# Patient Record
Sex: FEMALE | Race: WHITE | Employment: PART TIME | URBAN - METROPOLITAN AREA
[De-identification: names, ages, dates, MRNs, and addresses within clinical notes are randomized per-mention and may not be internally consistent; named-entity substitution may affect disease eponyms.]

---

## 2017-02-01 RX ORDER — RIZATRIPTAN BENZOATE 10 MG/1
10 TABLET ORAL AS NEEDED
COMMUNITY
Start: 2015-01-12 | End: 2018-03-20

## 2017-02-01 RX ORDER — MULTIVITAMIN
1 CAPSULE ORAL DAILY
COMMUNITY
Start: 2009-03-11

## 2017-02-01 RX ORDER — LEVOTHYROXINE SODIUM 0.15 MG/1
175 TABLET ORAL
COMMUNITY
Start: 2012-03-29 | End: 2017-09-12 | Stop reason: DRUGHIGH

## 2017-02-01 RX ORDER — TRIAMTERENE AND HYDROCHLOROTHIAZIDE 37.5; 25 MG/1; MG/1
CAPSULE ORAL
COMMUNITY
Start: 2011-08-23 | End: 2017-02-07

## 2017-02-01 RX ORDER — POTASSIUM CHLORIDE 20 MEQ/1
TABLET, EXTENDED RELEASE ORAL
Qty: 90 TABLET | Refills: 0 | Status: SHIPPED | OUTPATIENT
Start: 2017-02-01 | End: 2017-09-12

## 2017-02-01 RX ORDER — DESVENLAFAXINE 50 MG/1
TABLET, EXTENDED RELEASE ORAL
COMMUNITY
Start: 2009-03-11 | End: 2017-02-07

## 2017-02-01 RX ORDER — BETAMETHASONE VALERATE 0.1 %
LOTION (ML) TOPICAL
COMMUNITY
Start: 2007-07-17 | End: 2017-02-07

## 2017-02-01 RX ORDER — METOPROLOL SUCCINATE 25 MG/1
TABLET, EXTENDED RELEASE ORAL
COMMUNITY
Start: 2013-12-10 | End: 2017-02-07

## 2017-02-01 RX ORDER — RAMELTEON 8 MG/1
TABLET ORAL
COMMUNITY
Start: 2016-01-13 | End: 2017-03-03

## 2017-02-01 RX ORDER — ATORVASTATIN CALCIUM 20 MG/1
TABLET, FILM COATED ORAL
COMMUNITY
Start: 2007-06-10 | End: 2017-02-07

## 2017-02-01 RX ORDER — POTASSIUM CHLORIDE 20 MEQ/1
TABLET, EXTENDED RELEASE ORAL
COMMUNITY
Start: 2013-12-12 | End: 2017-02-07

## 2017-02-01 RX ORDER — CALCITRIOL 0.25 UG/1
0.25 CAPSULE, LIQUID FILLED ORAL DAILY
COMMUNITY
Start: 2013-12-10

## 2017-02-01 RX ORDER — CHLORAL HYDRATE 500 MG
1000 CAPSULE ORAL DAILY
COMMUNITY
Start: 2009-03-11

## 2017-02-07 ENCOUNTER — OFFICE VISIT (OUTPATIENT)
Dept: FAMILY MEDICINE CLINIC | Facility: CLINIC | Age: 57
End: 2017-02-07

## 2017-02-07 VITALS
SYSTOLIC BLOOD PRESSURE: 144 MMHG | WEIGHT: 217.63 LBS | HEIGHT: 66 IN | DIASTOLIC BLOOD PRESSURE: 82 MMHG | HEART RATE: 82 BPM | BODY MASS INDEX: 34.98 KG/M2 | TEMPERATURE: 99 F

## 2017-02-07 DIAGNOSIS — C73 MALIGNANT NEOPLASM OF THYROID GLAND (HCC): ICD-10-CM

## 2017-02-07 DIAGNOSIS — G47.30 SLEEP APNEA, UNSPECIFIED TYPE: ICD-10-CM

## 2017-02-07 DIAGNOSIS — E55.9 VITAMIN D DEFICIENCY: ICD-10-CM

## 2017-02-07 DIAGNOSIS — I10 BENIGN ESSENTIAL HYPERTENSION: ICD-10-CM

## 2017-02-07 DIAGNOSIS — E78.00 PURE HYPERCHOLESTEROLEMIA: ICD-10-CM

## 2017-02-07 DIAGNOSIS — Z00.00 HEALTH CARE MAINTENANCE: Primary | ICD-10-CM

## 2017-02-07 PROCEDURE — 99213 OFFICE O/P EST LOW 20 MIN: CPT | Performed by: FAMILY MEDICINE

## 2017-02-07 PROCEDURE — 99396 PREV VISIT EST AGE 40-64: CPT | Performed by: FAMILY MEDICINE

## 2017-02-07 RX ORDER — DESVENLAFAXINE 50 MG/1
50 TABLET, EXTENDED RELEASE ORAL DAILY
Qty: 90 TABLET | Refills: 1 | Status: SHIPPED | OUTPATIENT
Start: 2017-02-07 | End: 2017-11-10

## 2017-02-07 RX ORDER — METOPROLOL SUCCINATE 25 MG/1
25 TABLET, EXTENDED RELEASE ORAL DAILY
Qty: 90 TABLET | Refills: 1 | Status: SHIPPED | OUTPATIENT
Start: 2017-02-07 | End: 2017-11-10

## 2017-02-07 RX ORDER — BETAMETHASONE VALERATE 0.1 %
LOTION (ML) TOPICAL
Qty: 60 ML | Refills: 2 | Status: SHIPPED | OUTPATIENT
Start: 2017-02-07 | End: 2017-04-20

## 2017-02-07 RX ORDER — TRIAMTERENE AND HYDROCHLOROTHIAZIDE 37.5; 25 MG/1; MG/1
1 CAPSULE ORAL DAILY
Qty: 90 CAPSULE | Refills: 1 | Status: SHIPPED | OUTPATIENT
Start: 2017-02-07 | End: 2017-08-17

## 2017-02-07 RX ORDER — POTASSIUM CHLORIDE 20 MEQ/1
20 TABLET, EXTENDED RELEASE ORAL DAILY
Qty: 90 TABLET | Refills: 1 | Status: SHIPPED | OUTPATIENT
Start: 2017-02-07 | End: 2017-10-22

## 2017-02-07 RX ORDER — ATORVASTATIN CALCIUM 20 MG/1
20 TABLET, FILM COATED ORAL NIGHTLY
Qty: 90 TABLET | Refills: 1 | Status: SHIPPED | OUTPATIENT
Start: 2017-02-07 | End: 2017-10-03

## 2017-02-07 NOTE — PROGRESS NOTES
CC: Annual Physical Exam    HPI:   Florida Moreno is a 60year old female who presents for a complete physical exam.  Patient has no new complaints. Patient's past medical history and medications reviewed with her in detail.   Recent labs from endocrine revi Diagnosis Date   • Cancer [facility])      thyroid cancer   • Essential hypertension    • Hypothyroidism    • Sleep apnea           Past Surgical History    OTHER SURGICAL HISTORY      Comment fibroids     CHOLECYSTECTOMY      COLONOSCOPY      D & C        No bladder control. HEMATOLOGIC:  Denies anemia, bleeding or bruising. LYMPHATICS:  Denies enlarged nodes  PSYCHIATRIC:  Denies depression or anxiety. ENDOCRINOLOGIC:  Denies excessive sweating, cold or heat intolerance, polyuria or polydipsia.   ALLERGIES: for mammogram and dexascan  Self breast exam explained. Health maintenance, will check: No orders of the defined types were placed in this encounter.        Patient Instructions   rec fasting labs- chem and lipids, ua    Continue meds  D/w pt skin care  M Take 1 tablet (20 mEq total) by mouth daily. Triamterene-HCTZ (DYAZIDE) 37.5-25 MG Oral Cap 90 capsule 1      Sig: Take 1 capsule by mouth daily.            Imaging & Consults:  None    The patient is asked to return in 6 month    Mammogram yearly  Col

## 2017-02-07 NOTE — PATIENT INSTRUCTIONS
rec fasting labs- chem and lipids, ua    Continue meds  D/w pt skin care  Monitor diet and activity-goal of weight loss  Continue f.u sleep apnea reviewed

## 2017-02-08 ENCOUNTER — LAB ENCOUNTER (OUTPATIENT)
Dept: LAB | Age: 57
End: 2017-02-08
Attending: FAMILY MEDICINE
Payer: COMMERCIAL

## 2017-02-08 DIAGNOSIS — I10 BENIGN ESSENTIAL HYPERTENSION: ICD-10-CM

## 2017-02-08 DIAGNOSIS — E78.00 PURE HYPERCHOLESTEROLEMIA: ICD-10-CM

## 2017-02-08 LAB
ALBUMIN SERPL-MCNC: 3.7 G/DL (ref 3.5–4.8)
ALP LIVER SERPL-CCNC: 104 U/L (ref 46–118)
ALT SERPL-CCNC: 28 U/L (ref 14–54)
AST SERPL-CCNC: 14 U/L (ref 15–41)
BILIRUB SERPL-MCNC: 0.3 MG/DL (ref 0.1–2)
BILIRUB UR QL STRIP.AUTO: NEGATIVE
BUN BLD-MCNC: 10 MG/DL (ref 8–20)
CALCIUM BLD-MCNC: 7.8 MG/DL (ref 8.3–10.3)
CHLORIDE: 101 MMOL/L (ref 101–111)
CHOLEST SMN-MCNC: 169 MG/DL (ref ?–200)
CLARITY UR REFRACT.AUTO: CLEAR
CO2: 32 MMOL/L (ref 22–32)
COLOR UR AUTO: YELLOW
CREAT BLD-MCNC: 0.89 MG/DL (ref 0.55–1.02)
GLUCOSE BLD-MCNC: 94 MG/DL (ref 70–99)
GLUCOSE UR STRIP.AUTO-MCNC: NEGATIVE MG/DL
HDLC SERPL-MCNC: 70 MG/DL (ref 45–?)
HDLC SERPL: 2.41 {RATIO} (ref ?–4.44)
KETONES UR STRIP.AUTO-MCNC: NEGATIVE MG/DL
LDLC SERPL CALC-MCNC: 72 MG/DL (ref ?–130)
M PROTEIN MFR SERPL ELPH: 7.4 G/DL (ref 6.1–8.3)
NITRITE UR QL STRIP.AUTO: NEGATIVE
NONHDLC SERPL-MCNC: 99 MG/DL (ref ?–130)
PH UR STRIP.AUTO: 8 [PH] (ref 4.5–8)
POTASSIUM SERPL-SCNC: 3.8 MMOL/L (ref 3.6–5.1)
PROT UR STRIP.AUTO-MCNC: NEGATIVE MG/DL
RBC UR QL AUTO: NEGATIVE
SODIUM SERPL-SCNC: 139 MMOL/L (ref 136–144)
SP GR UR STRIP.AUTO: 1.01 (ref 1–1.03)
TRIGLYCERIDES: 136 MG/DL (ref ?–150)
UROBILINOGEN UR STRIP.AUTO-MCNC: <2 MG/DL
VLDL: 27 MG/DL (ref 5–40)

## 2017-02-08 PROCEDURE — 80061 LIPID PANEL: CPT

## 2017-02-08 PROCEDURE — 81001 URINALYSIS AUTO W/SCOPE: CPT

## 2017-02-08 PROCEDURE — 80053 COMPREHEN METABOLIC PANEL: CPT

## 2017-02-08 PROCEDURE — 87086 URINE CULTURE/COLONY COUNT: CPT

## 2017-02-10 ENCOUNTER — TELEPHONE (OUTPATIENT)
Dept: FAMILY MEDICINE CLINIC | Facility: CLINIC | Age: 57
End: 2017-02-10

## 2017-02-10 NOTE — TELEPHONE ENCOUNTER
----- Message from Radha Bourgeois MD sent at 2/10/2017  8:27 AM CST -----  Labs reviewed  Urine abnormal- high bacterial count but mixed brandi- if pt with s/s of uti I would treat; otherwise I would consider this contaminant with ua showing high level

## 2017-02-10 NOTE — TELEPHONE ENCOUNTER
Patient informed of the below results and recommendations. Patient states she is not having any urinary symptoms at this time but will c/b it symptoms do occur.  Fifi Kerr, 02/10/2017, 8:50 AM

## 2017-03-06 RX ORDER — RAMELTEON 8 MG
TABLET ORAL
Qty: 90 TABLET | Refills: 0 | Status: SHIPPED | OUTPATIENT
Start: 2017-03-06 | End: 2017-04-20

## 2017-03-06 NOTE — TELEPHONE ENCOUNTER
Pt is due for follow up appointment. Please have pt  Schedule apptointment . Refills until appointmentt ok.     Maria De Jesus Juarez MD

## 2017-04-20 ENCOUNTER — OFFICE VISIT (OUTPATIENT)
Dept: FAMILY MEDICINE CLINIC | Facility: CLINIC | Age: 57
End: 2017-04-20

## 2017-04-20 VITALS
WEIGHT: 273.63 LBS | HEART RATE: 89 BPM | DIASTOLIC BLOOD PRESSURE: 88 MMHG | TEMPERATURE: 99 F | SYSTOLIC BLOOD PRESSURE: 142 MMHG | BODY MASS INDEX: 44 KG/M2

## 2017-04-20 DIAGNOSIS — G47.33 OBSTRUCTIVE SLEEP APNEA SYNDROME: Primary | ICD-10-CM

## 2017-04-20 PROCEDURE — 94660 CPAP INITIATION&MGMT: CPT | Performed by: NURSE PRACTITIONER

## 2017-04-20 RX ORDER — RAMELTEON 8 MG/1
8 TABLET ORAL NIGHTLY
Qty: 90 TABLET | Refills: 1 | Status: SHIPPED | OUTPATIENT
Start: 2017-04-20 | End: 2017-05-15

## 2017-04-20 NOTE — PATIENT INSTRUCTIONS
Continue with CPAP therapy. Refilled Rozerem sent to pharmacy. Recheck in 6 months, sooner if any problems. Warned if still with sleep apnea and not using CPAP has 7 fold increased risk and heart attack, stroke, abnormal heart rhythm, and death.   Incr

## 2017-04-20 NOTE — PROGRESS NOTES
[facility]        HPI:   This is a 59year old female coming in for Patient presents with:  Obstructive Sleep Apnea (IRENE)    HPI:   Patient is present to review her compliance with sleep apnea treatment.  Had a few da Nocturia, Pulmonary HTN (PaO2 >60), LV dysfunciton, Snoring, Personality Change        Sleep Study Data   Sleep Study Date: 09/21/2012 (06/23/2015 10:04:18 AM)  Sleep Study Type Cpap Titration (06/23/2015 10:04:18 AM)  Sleep Lab: [place] (06/23/2015 10:04:18 A Comment:Other reaction(s): hives  Procaine                    Comment:Other reaction(s): shakey, heart racing, nervous  Current Meds:    Current Outpatient Prescriptions:  ramelteon (ROZEREM) 8 MG Oral Tab Take 1 tablet (8 mg total) by mouth nightly.  at be Insomnia     LAP-BAND surgery status     Enlarged lymph nodes     Blood in stool     Benign neoplasm of skin     Obesity     Palpitations     Periodic limb movement disorder     Menopausal and postmenopausal disorder     Pre-operative examination     Other adenopathy. Neurological: She is alert and oriented to person, place, and time. Skin: Skin is warm and dry. Psychiatric: She has a normal mood and affect.  Her behavior is normal. Judgment and thought content normal.   Nursing note and vitals reviewed

## 2017-05-15 ENCOUNTER — TELEPHONE (OUTPATIENT)
Dept: FAMILY MEDICINE CLINIC | Facility: CLINIC | Age: 57
End: 2017-05-15

## 2017-05-15 RX ORDER — ESZOPICLONE 2 MG/1
2 TABLET, FILM COATED ORAL NIGHTLY PRN
Qty: 90 TABLET | Refills: 0 | Status: SHIPPED
Start: 2017-05-15 | End: 2017-08-01

## 2017-05-15 NOTE — TELEPHONE ENCOUNTER
Fax received from [facility] listing alternatives to Rozerem that would cost less for patient. List includes, starting with the most expensive ending in the list expensive:    1. Eszopiclone 1mg tabs  2. Eszopiclone 2mg tabs  3.   Eszopiclone 3mg ta

## 2017-05-15 NOTE — TELEPHONE ENCOUNTER
Patient has been on Ambien in the past and did not help her sleep well. Can try Lunesta 2 mg nightly as needed for sleep.

## 2017-05-15 NOTE — TELEPHONE ENCOUNTER
Last Labs:  2/8/2017  Last OV:  2/7/2017  Future Appointments  Date Time Provider Lasha Still   10/19/2017 11:15 AM Neftaly Soares, DANYA [facility] [place] [facility] [place]

## 2017-05-15 NOTE — TELEPHONE ENCOUNTER
LM informing patient of the below. Prescription faxed to [facility].  Gerhard Will, 05/15/2017, 10:40 AM

## 2017-06-22 RX ORDER — DESVENLAFAXINE 50 MG/1
TABLET, EXTENDED RELEASE ORAL
Qty: 90 TABLET | OUTPATIENT
Start: 2017-06-22

## 2017-06-22 NOTE — TELEPHONE ENCOUNTER
Patient was given a 6 month refill by Dr. Saurabh Rasheed in February. Should have enough medication till her 6 month recheck with Dr. Saurabh Rasheed.

## 2017-06-22 NOTE — TELEPHONE ENCOUNTER
Future Appointments  Date Time Provider Lasha Still   10/19/2017 11:15 AM Dory Soares APN [facility] [place] [facility], 06/22/2017, 7:33 AM

## 2017-06-22 NOTE — TELEPHONE ENCOUNTER
Patient was given a 6 month refill by Dr. Vanna Pleitez in February and told to follow up in 6 months with Dr. Vanna Pleitez. Please let patient know. Thank you.   Cony Soares, 06/22/2017, 12:13 PM

## 2017-08-02 ENCOUNTER — PATIENT MESSAGE (OUTPATIENT)
Dept: FAMILY MEDICINE CLINIC | Facility: CLINIC | Age: 57
End: 2017-08-02

## 2017-08-02 RX ORDER — ESZOPICLONE 2 MG/1
2 TABLET, FILM COATED ORAL NIGHTLY PRN
Qty: 90 TABLET | Refills: 0 | Status: SHIPPED
Start: 2017-08-02 | End: 2017-08-02

## 2017-08-02 RX ORDER — ESZOPICLONE 3 MG/1
3 TABLET, FILM COATED ORAL NIGHTLY PRN
Qty: 90 TABLET | Refills: 0 | Status: SHIPPED
Start: 2017-08-02 | End: 2017-10-19

## 2017-08-02 NOTE — TELEPHONE ENCOUNTER
Future Appointments  Date Time Provider Lasha Tessy   10/19/2017 11:15 AM Bernie Soares APN [facility] [place] [facility] [place]     Recheck in 6 months, sooner if any problems.

## 2017-08-18 RX ORDER — TRIAMTERENE AND HYDROCHLOROTHIAZIDE 37.5; 25 MG/1; MG/1
CAPSULE ORAL
Qty: 90 CAPSULE | Refills: 0 | Status: SHIPPED | OUTPATIENT
Start: 2017-08-18 | End: 2017-11-15

## 2017-08-18 NOTE — TELEPHONE ENCOUNTER
Pt last seen for annual exam on 2/7/17- at that time plan included pt f/u in 6 months. Pt states she was not aware of that, pt states she will call back to get scheduled.     Future Appointments  Date Time Provider Lasha Still   10/19/2017 11:15 AM S

## 2017-09-08 RX ORDER — BETAMETHASONE VALERATE 0.1 %
LOTION (ML) TOPICAL
COMMUNITY
End: 2017-09-08

## 2017-09-08 RX ORDER — BETAMETHASONE VALERATE 0.1 %
LOTION (ML) TOPICAL
Qty: 180 ML | Refills: 0 | Status: SHIPPED | OUTPATIENT
Start: 2017-09-08 | End: 2017-10-17

## 2017-09-08 NOTE — TELEPHONE ENCOUNTER
----- Message from Shikha Sorto sent at 9/7/2017  9:20 PM CDT -----  Regarding: Prescription Question  Contact: [phone]  I need a refill on   Betamethasone Melina Lotion 60ml - However, I need 3 bottles ordered for one shipment. One for each month.  I kris

## 2017-09-08 NOTE — TELEPHONE ENCOUNTER
Future appt:     Your appointments     Date & Time Appointment Department [facility])    Sep 12, 2017  8:30 AM CDT Follow up - Extended with Joel Horton MD 25 [facility] [place] ([facility])    Oct 19, 2017 11:

## 2017-09-08 NOTE — TELEPHONE ENCOUNTER
Last appt:  2/7/17  Last lab: 2/8/17    Future Appointments  Date Time Provider Lasha Still   9/12/2017 8:30 AM Jade Galarza MD [facility] [place] [facility] [place]   10/19/2017 11:15 AM Supriya Soares APN [facility] [place] [facility] [place]     Checked [facility]

## 2017-09-12 ENCOUNTER — APPOINTMENT (OUTPATIENT)
Dept: LAB | Age: 57
End: 2017-09-12
Attending: FAMILY MEDICINE
Payer: COMMERCIAL

## 2017-09-12 ENCOUNTER — OFFICE VISIT (OUTPATIENT)
Dept: FAMILY MEDICINE CLINIC | Facility: CLINIC | Age: 57
End: 2017-09-12

## 2017-09-12 VITALS
RESPIRATION RATE: 16 BRPM | SYSTOLIC BLOOD PRESSURE: 132 MMHG | BODY MASS INDEX: 43.07 KG/M2 | DIASTOLIC BLOOD PRESSURE: 78 MMHG | HEART RATE: 64 BPM | TEMPERATURE: 98 F | HEIGHT: 66.25 IN | WEIGHT: 268 LBS

## 2017-09-12 DIAGNOSIS — C73 MALIGNANT NEOPLASM OF THYROID GLAND (HCC): ICD-10-CM

## 2017-09-12 DIAGNOSIS — I10 BENIGN ESSENTIAL HYPERTENSION: Primary | ICD-10-CM

## 2017-09-12 DIAGNOSIS — E78.00 PURE HYPERCHOLESTEROLEMIA: ICD-10-CM

## 2017-09-12 PROBLEM — Z00.00 HEALTH CARE MAINTENANCE: Status: RESOLVED | Noted: 2017-02-07 | Resolved: 2017-09-12

## 2017-09-12 LAB
ALBUMIN SERPL-MCNC: 3.6 G/DL (ref 3.5–4.8)
ALP LIVER SERPL-CCNC: 92 U/L (ref 46–118)
ALT SERPL-CCNC: 29 U/L (ref 14–54)
AST SERPL-CCNC: 11 U/L (ref 15–41)
BILIRUB SERPL-MCNC: 0.5 MG/DL (ref 0.1–2)
BUN BLD-MCNC: 17 MG/DL (ref 8–20)
CALCIUM BLD-MCNC: 8.6 MG/DL (ref 8.3–10.3)
CHLORIDE: 104 MMOL/L (ref 101–111)
CHOLEST SMN-MCNC: 156 MG/DL (ref ?–200)
CO2: 29 MMOL/L (ref 22–32)
CREAT BLD-MCNC: 0.89 MG/DL (ref 0.55–1.02)
GLUCOSE BLD-MCNC: 112 MG/DL (ref 70–99)
HDLC SERPL-MCNC: 55 MG/DL (ref 45–?)
HDLC SERPL: 2.84 {RATIO} (ref ?–4.44)
LDLC SERPL CALC-MCNC: 66 MG/DL (ref ?–130)
LDLC SERPL-MCNC: 35 MG/DL (ref 5–40)
M PROTEIN MFR SERPL ELPH: 7.4 G/DL (ref 6.1–8.3)
NONHDLC SERPL-MCNC: 101 MG/DL (ref ?–130)
POTASSIUM SERPL-SCNC: 4.2 MMOL/L (ref 3.6–5.1)
SODIUM SERPL-SCNC: 142 MMOL/L (ref 136–144)
TRIGLYCERIDES: 174 MG/DL (ref ?–150)

## 2017-09-12 PROCEDURE — 99214 OFFICE O/P EST MOD 30 MIN: CPT | Performed by: FAMILY MEDICINE

## 2017-09-12 PROCEDURE — 80053 COMPREHEN METABOLIC PANEL: CPT | Performed by: FAMILY MEDICINE

## 2017-09-12 PROCEDURE — 80061 LIPID PANEL: CPT | Performed by: FAMILY MEDICINE

## 2017-09-12 PROCEDURE — 36415 COLL VENOUS BLD VENIPUNCTURE: CPT | Performed by: FAMILY MEDICINE

## 2017-09-12 RX ORDER — LEVOTHYROXINE SODIUM 137 MCG
137 TABLET ORAL DAILY
COMMUNITY
Start: 2017-07-12 | End: 2018-03-20 | Stop reason: DRUGHIGH

## 2017-09-12 NOTE — PROGRESS NOTES
[facility] [place]  PROGRESS NOTE  Chief Complaint:   Patient presents with:  Blood Pressure      HPI:   This is a 59year old female coming in for medical f.u. Pt with htn and elevated lipids. Pt tolereating meds well, generally feeling well. Alcohol use: Yes           0.0 oz/week       Comment: 1-2 drinks every 6 months-mixed    Drug use: No            Other Topics            Concern  Caffeine Concern        No  Exercise                No  Seat Belt               Yes  Special Diet Oral Tab Take 137 mcg by mouth daily. Disp:  Rfl:    Rizatriptan Benzoate 10 MG Oral Tab Take 10 mg by mouth as needed.    Disp:  Rfl:       Counseling given: Not Answered       REVIEW OF SYSTEMS:   CONSTITUTIONAL:  Denies unusual weight gain/loss, fever, c Eyes: EOMI, PERRLA, no scleral icterus, conjunctivae clear bilaterally, no eye discharge Ears: External normal. Nose: patent, no nasal discharge Throat:  No tonsillar erythema or exudate. Mouth:  No oral lesions or ulcerations, good dentition.   NECK: Supp nodes     Blood in stool     Benign neoplasm of skin     Obesity     Palpitations     Periodic limb movement disorder     Menopausal and postmenopausal disorder     Other psoriasis     Other seborrheic keratosis     Pain in joint, shoulder region     Sleep

## 2017-09-12 NOTE — PATIENT INSTRUCTIONS
Continue meds    Fasting chem panel and lipids    Encourage home exercise program    Recheck with fasting labs and physical in FEB    Continue f.u Dr. Ash Herrera for thyroid and with Sleep lab

## 2017-09-13 ENCOUNTER — TELEPHONE (OUTPATIENT)
Dept: FAMILY MEDICINE CLINIC | Facility: CLINIC | Age: 57
End: 2017-09-13

## 2017-09-13 DIAGNOSIS — E78.2 MIXED HYPERLIPIDEMIA: Primary | ICD-10-CM

## 2017-09-13 DIAGNOSIS — R73.09 ABNORMAL GLUCOSE: ICD-10-CM

## 2017-09-13 NOTE — TELEPHONE ENCOUNTER
----- Message from Chacho Burnham MD sent at 9/13/2017  8:09 AM CDT -----  Labs reviewed  Glucose and triglycerides slightly higher than previous. Rest of labs in desired range. Pt at risk of diabetes.  I rec she monitor diet, low concentrated sweets a

## 2017-10-04 RX ORDER — ATORVASTATIN CALCIUM 20 MG/1
TABLET, FILM COATED ORAL
Qty: 90 TABLET | Refills: 1 | Status: SHIPPED | OUTPATIENT
Start: 2017-10-04 | End: 2018-03-09

## 2017-10-04 NOTE — TELEPHONE ENCOUNTER
Future appt:     Your appointments     Date & Time Appointment Department [facility])    Oct 19, 2017 11:15 AM CDT Sleep Follow Up with DANYA Hall 25 [facility] ([facility])        [employer]

## 2017-10-16 ENCOUNTER — TELEPHONE (OUTPATIENT)
Dept: FAMILY MEDICINE CLINIC | Facility: CLINIC | Age: 57
End: 2017-10-16

## 2017-10-16 NOTE — TELEPHONE ENCOUNTER
growth at rectum area, hard, golfball/baseball size, painful, is set with Sukhdev Horne for tomorrow has questions about what may go on in the appointment, nathan it?

## 2017-10-16 NOTE — TELEPHONE ENCOUNTER
CORALI Patient states she noticed a baseball/golfball sized growth on the outer area of her rectal area two days ago. Has never happened before. Patient states she thinks its a cyst. States it hurts to sit. Patient denies noticing blood.  Patient wanted to kno

## 2017-10-17 ENCOUNTER — OFFICE VISIT (OUTPATIENT)
Dept: FAMILY MEDICINE CLINIC | Facility: CLINIC | Age: 57
End: 2017-10-17

## 2017-10-17 VITALS
SYSTOLIC BLOOD PRESSURE: 118 MMHG | HEIGHT: 67 IN | WEIGHT: 264 LBS | RESPIRATION RATE: 16 BRPM | TEMPERATURE: 98 F | HEART RATE: 68 BPM | BODY MASS INDEX: 41.44 KG/M2 | DIASTOLIC BLOOD PRESSURE: 68 MMHG

## 2017-10-17 DIAGNOSIS — L02.214 ABSCESS OF LEFT GROIN: Primary | ICD-10-CM

## 2017-10-17 PROCEDURE — 10060 I&D ABSCESS SIMPLE/SINGLE: CPT | Performed by: NURSE PRACTITIONER

## 2017-10-17 PROCEDURE — 87205 SMEAR GRAM STAIN: CPT | Performed by: NURSE PRACTITIONER

## 2017-10-17 PROCEDURE — 87070 CULTURE OTHR SPECIMN AEROBIC: CPT | Performed by: NURSE PRACTITIONER

## 2017-10-17 PROCEDURE — 99213 OFFICE O/P EST LOW 20 MIN: CPT | Performed by: NURSE PRACTITIONER

## 2017-10-17 PROCEDURE — 87077 CULTURE AEROBIC IDENTIFY: CPT | Performed by: NURSE PRACTITIONER

## 2017-10-17 RX ORDER — CEPHALEXIN 500 MG/1
500 CAPSULE ORAL 3 TIMES DAILY
Qty: 21 CAPSULE | Refills: 0 | Status: SHIPPED | OUTPATIENT
Start: 2017-10-17 | End: 2017-10-24

## 2017-10-17 NOTE — PATIENT INSTRUCTIONS
Keep area covered with the [place] today. Do not get it wet. Follow up tomorrow for a recheck.      Start Keflex three times a day for 5-7 days     Culture was obtained- results will be back in about 2 days

## 2017-10-17 NOTE — PROGRESS NOTES
Bear Nolan is a 59year old female. Patient presents with:  Growth: near rectum, pt noticed it 3 days ago.  Painful and growing   Other: Pt is fasting if BW is needed       HPI:   Complaints of a lump to buttock-  Painful-    Noticed it 2 days ago in zunilda 37.5-25 MG Oral Cap TAKE 1 CAPSULE DAILY Disp: 90 capsule Rfl: 0   Eszopiclone (LUNESTA) 3 MG Oral Tab Take 3 mg by mouth nightly as needed.  Disp: 90 tablet Rfl: 0   BETAMETHASONE VALERATE 0.1 % External Cream APPLY TWICE A DAY AS NEEDED Disp: 180 g Rfl: 0 PSYCH:denies complaints     EXAM:   /68 (BP Location: Left arm, Patient Position: Sitting, Cuff Size: large)   Pulse 68   Temp 97.9 °F (36.6 °C) (Tympanic)   Resp 16   Ht 67\"   Wt 264 lb   BMI 41.35 kg/m²   GENERAL: well developed, well nourished, by 2 providers) appointment made with Dr. Manzo Prior for a wound check tomorrow.

## 2017-10-18 ENCOUNTER — OFFICE VISIT (OUTPATIENT)
Dept: FAMILY MEDICINE CLINIC | Facility: CLINIC | Age: 57
End: 2017-10-18

## 2017-10-18 VITALS
BODY MASS INDEX: 41.75 KG/M2 | SYSTOLIC BLOOD PRESSURE: 144 MMHG | WEIGHT: 266 LBS | RESPIRATION RATE: 20 BRPM | TEMPERATURE: 98 F | DIASTOLIC BLOOD PRESSURE: 84 MMHG | HEIGHT: 67 IN | HEART RATE: 78 BPM

## 2017-10-18 DIAGNOSIS — L02.214 ABSCESS OF LEFT GROIN: Primary | ICD-10-CM

## 2017-10-18 PROCEDURE — 99213 OFFICE O/P EST LOW 20 MIN: CPT | Performed by: FAMILY MEDICINE

## 2017-10-18 NOTE — PATIENT INSTRUCTIONS
Finish the course of antibiotics  Also recommend daily warm compress. Return to clinic if any increase redness, pain, warmth, fever or oozing.

## 2017-10-18 NOTE — PROGRESS NOTES
[facility] [place]  PROGRESS NOTE  Chief Complaint:   Patient presents with:  Wound Recheck      HPI:   This is a 59year old female presents to have her abscess recheck.   Yesterday patient was seen in clinic and had a small incision make an abs 600-400 MG-UNIT Oral Tab Take by mouth. 2-4 tabs per day Disp:  Rfl:    TRIAMTERENE-HCTZ 37.5-25 MG Oral Cap TAKE 1 CAPSULE DAILY Disp: 90 capsule Rfl: 0   Eszopiclone (LUNESTA) 3 MG Oral Tab Take 3 mg by mouth nightly as needed.  Disp: 90 tablet Rfl: 0   B 266 lb. Vital signs reviewed. Physical Exam:  GEN:  Patient is alert, awake and oriented, well developed, well nourished, no apparent distress. LUNGS: Clear to auscultation bilterally, no rales/rhonchi/wheezing.   HEART:  Regular rate and rhythm, no mu Pure hypercholesterolemia     Benign essential hypertension     Insomnia     LAP-BAND surgery status     Enlarged lymph nodes     Blood in stool     Benign neoplasm of skin     Obesity     Palpitations     Periodic limb movement disorder     Menopausal and

## 2017-10-19 ENCOUNTER — OFFICE VISIT (OUTPATIENT)
Dept: FAMILY MEDICINE CLINIC | Facility: CLINIC | Age: 57
End: 2017-10-19

## 2017-10-19 ENCOUNTER — TELEPHONE (OUTPATIENT)
Dept: FAMILY MEDICINE CLINIC | Facility: CLINIC | Age: 57
End: 2017-10-19

## 2017-10-19 VITALS
HEART RATE: 68 BPM | SYSTOLIC BLOOD PRESSURE: 120 MMHG | BODY MASS INDEX: 42.03 KG/M2 | HEIGHT: 67 IN | DIASTOLIC BLOOD PRESSURE: 70 MMHG | TEMPERATURE: 97 F | WEIGHT: 267.81 LBS | RESPIRATION RATE: 20 BRPM

## 2017-10-19 DIAGNOSIS — G47.33 OSA (OBSTRUCTIVE SLEEP APNEA): Primary | ICD-10-CM

## 2017-10-19 DIAGNOSIS — G47.61 PERIODIC LIMB MOVEMENT DISORDER: ICD-10-CM

## 2017-10-19 PROCEDURE — 94660 CPAP INITIATION&MGMT: CPT | Performed by: NURSE PRACTITIONER

## 2017-10-19 RX ORDER — ESZOPICLONE 3 MG/1
3 TABLET, FILM COATED ORAL NIGHTLY PRN
Qty: 90 TABLET | Refills: 1 | Status: SHIPPED
Start: 2017-10-19 | End: 2018-03-17

## 2017-10-19 NOTE — PATIENT INSTRUCTIONS
Get overnight O2 trend. Refill done on Lunesta. Recheck in 6 months if normal.     Warned if still with sleep apnea and not using CPAP has 7 fold increased risk and heart attack, stroke, abnormal heart rhythm, and death.   Increased risk of driving acci

## 2017-10-19 NOTE — TELEPHONE ENCOUNTER
Consulted with Dr. Anais Washington after patient left office. Recommends to have patient get a sleep study to get a new machine and to ensure that she is on appropriate therapy and to see if she can get off some of the sleep aids.    Contacted patient and she is agree

## 2017-10-19 NOTE — PROGRESS NOTES
[facility]  [facility]        HPI:   This is a 59year old female coming in for Patient presents with:  Obstructive Sleep Apnea (IRENE): Sleep compliance f/u       HPI:  States that sleeps well using CPAP.  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 HENT:   Head: Normocephalic and atraumatic.    Right Ear: External ear normal.   Left Ear: External ear normal.   Nose: Nose normal.   Mouth/Throat: Oropharynx is clear and moist.   Mal 2, tonsils 0   Eyes: Conjunctivae symptoms. Parent is to call with any side effects or complications from the treatments as a result of today.        DANYA Harris  10/19/2017  11:16 AM

## 2017-10-21 ENCOUNTER — TELEPHONE (OUTPATIENT)
Dept: FAMILY MEDICINE CLINIC | Facility: CLINIC | Age: 57
End: 2017-10-21

## 2017-10-21 NOTE — TELEPHONE ENCOUNTER
----- Message from DANYA Jordan sent at 10/21/2017  8:05 AM CDT -----  Wound culture grew normal skin bacteria

## 2017-10-23 RX ORDER — POTASSIUM CHLORIDE 20 MEQ/1
TABLET, EXTENDED RELEASE ORAL
Qty: 90 TABLET | Refills: 1 | Status: SHIPPED | OUTPATIENT
Start: 2017-10-23 | End: 2018-04-20

## 2017-10-23 NOTE — TELEPHONE ENCOUNTER
Future appt:     Your appointments     Date & Time Appointment Department [facility])    Apr 19, 2018 10:00 AM CDT Sleep Follow Up with DANYA Zavala 25 [facility], [facility] ([facility])        [employer]

## 2017-10-24 ENCOUNTER — TELEPHONE (OUTPATIENT)
Dept: FAMILY MEDICINE CLINIC | Facility: CLINIC | Age: 57
End: 2017-10-24

## 2017-10-25 NOTE — TELEPHONE ENCOUNTER
Cony, since patient already has a CPaP machine, is the sleep study supposed to be a titration study? It is ordered as diagnostic. Please advise and I will call [facility] sleep lab to let them know exactly what you are ordering. Thanks.

## 2017-10-25 NOTE — PROGRESS NOTES
[facility]  SLEEP PROGRESS NOTE        HPI:   This is a 59year old female coming in for Patient presents with:  Obstructive Sleep Apnea (IRENE): Sleep compliance f/u     updating diagnosis and follow-up      HPI:   Patient: Sleep review o reaction(s): hives  Iodine (Topical)        Hives  Procaine                    Comment:Other reaction(s): shakey, heart racing, nervous  Current Meds:    Current Outpatient Prescriptions:  Eszopiclone (LUNESTA) 3 MG Oral Tab Take 3 mg by mouth nightly as n hypertension     Insomnia     LAP-BAND surgery status     Enlarged lymph nodes     Blood in stool     Benign neoplasm of skin     Obesity     Palpitations     Periodic limb movement disorder     Menopausal and postmenopausal disorder     Other psoriasis still with sleep apnea and not using CPAP has a  7 fold increase in risk of heart attack, stroke, abnormal heart rhythm  and death,  increased risk of driving accidents. Advised to refrain from driving when sleepy.     COMPLIANCE is required by insurance

## 2017-11-10 RX ORDER — DESVENLAFAXINE 50 MG/1
TABLET, EXTENDED RELEASE ORAL
Qty: 90 TABLET | Refills: 1 | Status: SHIPPED | OUTPATIENT
Start: 2017-11-10 | End: 2018-05-06

## 2017-11-10 RX ORDER — METOPROLOL SUCCINATE 25 MG/1
TABLET, EXTENDED RELEASE ORAL
Qty: 90 TABLET | Refills: 1 | Status: SHIPPED | OUTPATIENT
Start: 2017-11-10 | End: 2018-05-06

## 2017-11-10 NOTE — TELEPHONE ENCOUNTER
Future appt:     Your appointments     Date & Time Appointment Department [facility])    Apr 19, 2018 10:00 AM CDT Sleep Follow Up with DANYA Scanlon [address] [place] [address], [facility] ([facility])        [address]

## 2017-11-16 RX ORDER — TRIAMTERENE AND HYDROCHLOROTHIAZIDE 37.5; 25 MG/1; MG/1
CAPSULE ORAL
Qty: 90 CAPSULE | Refills: 0 | Status: SHIPPED | OUTPATIENT
Start: 2017-11-16 | End: 2018-02-13

## 2017-11-16 NOTE — TELEPHONE ENCOUNTER
Future appt:     Your appointments     Date & Time Appointment Department [facility])    Apr 19, 2018 10:00 AM CDT Sleep Follow Up with DANYA Vargas 25 [facility] ([facility])        [employer]

## 2017-11-17 ENCOUNTER — SLEEP STUDY (OUTPATIENT)
Dept: FAMILY MEDICINE CLINIC | Facility: CLINIC | Age: 57
End: 2017-11-17

## 2017-11-17 DIAGNOSIS — G47.33 OBSTRUCTIVE SLEEP APNEA: Primary | ICD-10-CM

## 2017-11-17 PROCEDURE — 95811 POLYSOM 6/>YRS CPAP 4/> PARM: CPT | Performed by: FAMILY MEDICINE

## 2017-11-18 ENCOUNTER — TELEPHONE (OUTPATIENT)
Dept: FAMILY MEDICINE CLINIC | Facility: CLINIC | Age: 57
End: 2017-11-18

## 2017-11-18 NOTE — TELEPHONE ENCOUNTER
Entered patient's sleep study to flow sheet. Thank you. [facility] Informed patient to call the office the day she gets her new machine to schedule a follow up with Dr. Verónica Jacobs.  Patient verbalized understanding and expre

## 2017-11-24 RX ORDER — BETAMETHASONE VALERATE 0.1 %
LOTION (ML) TOPICAL
Qty: 180 ML | Refills: 0 | Status: SHIPPED | OUTPATIENT
Start: 2017-11-24 | End: 2018-02-28

## 2017-11-24 NOTE — TELEPHONE ENCOUNTER
Future appt:     Your appointments     Date & Time Appointment Department [facility])    Apr 19, 2018 10:00 AM CDT Sleep Follow Up with DANYA Walters 25 [facility]        [employer]

## 2018-01-16 ENCOUNTER — APPOINTMENT (OUTPATIENT)
Dept: LAB | Age: 58
End: 2018-01-16
Attending: FAMILY MEDICINE
Payer: COMMERCIAL

## 2018-01-16 DIAGNOSIS — E78.2 MIXED HYPERLIPIDEMIA: ICD-10-CM

## 2018-01-16 DIAGNOSIS — R73.09 ABNORMAL GLUCOSE: ICD-10-CM

## 2018-01-16 LAB
ALBUMIN SERPL-MCNC: 3.5 G/DL (ref 3.5–4.8)
ALP LIVER SERPL-CCNC: 103 U/L (ref 46–118)
ALT SERPL-CCNC: 31 U/L (ref 14–54)
AST SERPL-CCNC: 16 U/L (ref 15–41)
BILIRUB SERPL-MCNC: 0.4 MG/DL (ref 0.1–2)
BUN BLD-MCNC: 13 MG/DL (ref 8–20)
CALCIUM BLD-MCNC: 8.5 MG/DL (ref 8.3–10.3)
CHLORIDE: 102 MMOL/L (ref 101–111)
CHOLEST SMN-MCNC: 172 MG/DL (ref ?–200)
CO2: 29 MMOL/L (ref 22–32)
CREAT BLD-MCNC: 0.77 MG/DL (ref 0.55–1.02)
EST. AVERAGE GLUCOSE BLD GHB EST-MCNC: 131 MG/DL (ref 68–126)
GLUCOSE BLD-MCNC: 105 MG/DL (ref 70–99)
HBA1C MFR BLD HPLC: 6.2 % (ref ?–5.7)
HDLC SERPL-MCNC: 56 MG/DL (ref 45–?)
HDLC SERPL: 3.07 {RATIO} (ref ?–4.44)
LDLC SERPL CALC-MCNC: 66 MG/DL (ref ?–130)
M PROTEIN MFR SERPL ELPH: 7.4 G/DL (ref 6.1–8.3)
NONHDLC SERPL-MCNC: 116 MG/DL (ref ?–130)
POTASSIUM SERPL-SCNC: 3.7 MMOL/L (ref 3.6–5.1)
SODIUM SERPL-SCNC: 142 MMOL/L (ref 136–144)
TRIGL SERPL-MCNC: 249 MG/DL (ref ?–150)
VLDLC SERPL CALC-MCNC: 50 MG/DL (ref 5–40)

## 2018-01-16 PROCEDURE — 80061 LIPID PANEL: CPT | Performed by: FAMILY MEDICINE

## 2018-01-16 PROCEDURE — 36415 COLL VENOUS BLD VENIPUNCTURE: CPT | Performed by: FAMILY MEDICINE

## 2018-01-16 PROCEDURE — 83036 HEMOGLOBIN GLYCOSYLATED A1C: CPT | Performed by: FAMILY MEDICINE

## 2018-01-16 PROCEDURE — 80053 COMPREHEN METABOLIC PANEL: CPT | Performed by: FAMILY MEDICINE

## 2018-01-17 ENCOUNTER — TELEPHONE (OUTPATIENT)
Dept: FAMILY MEDICINE CLINIC | Facility: CLINIC | Age: 58
End: 2018-01-17

## 2018-01-17 NOTE — TELEPHONE ENCOUNTER
----- Message from Nuha Campos MD sent at 1/17/2018  7:38 AM CST -----  Labs reviewed. HGBA1c 6.2  Chem panel normal  Lipids - normal cholesterol, triglicerides elevated.  Pt encouraged to monitor diet and sugar/ carb intake  Pt to follow up for any

## 2018-02-14 RX ORDER — TRIAMTERENE AND HYDROCHLOROTHIAZIDE 37.5; 25 MG/1; MG/1
CAPSULE ORAL
Qty: 90 CAPSULE | Refills: 0 | Status: SHIPPED | OUTPATIENT
Start: 2018-02-14 | End: 2018-05-14

## 2018-02-14 NOTE — TELEPHONE ENCOUNTER
Future appt:     Your appointments     Date & Time Appointment Department [facility])    Mar 20, 2018 11:00 AM CDT Physical - Established Patient with Khang Alvarez MD 25 [facility], Donnie Macario ([facility])    Apr 19

## 2018-03-01 RX ORDER — BETAMETHASONE VALERATE 0.1 %
LOTION (ML) TOPICAL
Qty: 180 ML | Refills: 0 | Status: SHIPPED | OUTPATIENT
Start: 2018-03-01 | End: 2018-03-20

## 2018-03-01 NOTE — TELEPHONE ENCOUNTER
Future appt:     Your appointments     Date & Time Appointment Department [facility])    Mar 20, 2018 11:00 AM CDT Physical - Established Patient with Rosa Hou MD 25 [facility] ([facility])    Apr 19

## 2018-03-10 RX ORDER — ATORVASTATIN CALCIUM 20 MG/1
TABLET, FILM COATED ORAL
Qty: 90 TABLET | Refills: 0 | Status: SHIPPED | OUTPATIENT
Start: 2018-03-10 | End: 2018-06-07

## 2018-03-10 NOTE — TELEPHONE ENCOUNTER
Future appt: Your appointments     Date & Time Appointment Department [facility])    Mar 20, 2018 11:00 AM CDT Physical - Established Patient with Marcus Scott MD 25 [facility])    Apr 19, 2018 10:00 AM CDT Sleep Follow Up with DANYA Begum 25 [facility])        25 [facility]  [id number] [phone]        Last Appointment:  9/12/2017    Cholesterol, Total (mg/dL)   Date Value   01/16/2018 172   ----------  HDL Cholesterol (mg/dL)   Date Value   01/16/2018 56   ----------  LDL Cholesterol (mg/dL)   Date Value   01/16/2018 66   ----------  Triglycerides (mg/dL)   Date Value   01/16/2018 249 (H)   ----------    Lab Results  Component Value Date    (H) 01/16/2018   A1C 6.2 (H) 01/16/2018     No results found for: T4F, TSH, TSHT4    Last RF:  10/4/2017    No Follow-up on file.

## 2018-03-17 DIAGNOSIS — G47.61 PERIODIC LIMB MOVEMENT DISORDER: ICD-10-CM

## 2018-03-17 DIAGNOSIS — G47.33 OSA (OBSTRUCTIVE SLEEP APNEA): ICD-10-CM

## 2018-03-19 RX ORDER — ESZOPICLONE 3 MG/1
3 TABLET, FILM COATED ORAL NIGHTLY PRN
Qty: 90 TABLET | Refills: 1
Start: 2018-03-19 | End: 2018-04-19

## 2018-03-19 NOTE — TELEPHONE ENCOUNTER
From: Maynor Samuels  Sent: 3/17/2018 8:30 PM CDT  Subject: Medication Renewal Request    Maynor Samuels would like a refill of the following medications:     Eszopiclone (LUNESTA) 3 MG Oral Tab DANYA Urena]    [facility]

## 2018-03-19 NOTE — TELEPHONE ENCOUNTER
Future appt:     Your appointments     Date & Time Appointment Department [facility])    Mar 20, 2018 11:00 AM CDT Physical - Established Patient with Josh Lyles MD 25 [facility], [facility] ([facility])    Apr 19

## 2018-03-20 ENCOUNTER — OFFICE VISIT (OUTPATIENT)
Dept: FAMILY MEDICINE CLINIC | Facility: CLINIC | Age: 58
End: 2018-03-20

## 2018-03-20 VITALS
RESPIRATION RATE: 14 BRPM | WEIGHT: 275 LBS | BODY MASS INDEX: 43.16 KG/M2 | HEIGHT: 67 IN | DIASTOLIC BLOOD PRESSURE: 62 MMHG | SYSTOLIC BLOOD PRESSURE: 130 MMHG | TEMPERATURE: 98 F | OXYGEN SATURATION: 98 % | HEART RATE: 68 BPM

## 2018-03-20 DIAGNOSIS — L40.8 OTHER PSORIASIS: ICD-10-CM

## 2018-03-20 DIAGNOSIS — Z98.84 LAP-BAND SURGERY STATUS: ICD-10-CM

## 2018-03-20 DIAGNOSIS — E20.9 HYPOPARATHYROIDISM, UNSPECIFIED HYPOPARATHYROIDISM TYPE (HCC): ICD-10-CM

## 2018-03-20 DIAGNOSIS — E55.9 VITAMIN D DEFICIENCY: ICD-10-CM

## 2018-03-20 DIAGNOSIS — E78.00 PURE HYPERCHOLESTEROLEMIA: ICD-10-CM

## 2018-03-20 DIAGNOSIS — I10 BENIGN ESSENTIAL HYPERTENSION: ICD-10-CM

## 2018-03-20 DIAGNOSIS — G47.33 OBSTRUCTIVE SLEEP APNEA SYNDROME: ICD-10-CM

## 2018-03-20 DIAGNOSIS — E89.0 POSTOPERATIVE HYPOTHYROIDISM: ICD-10-CM

## 2018-03-20 DIAGNOSIS — C73 MALIGNANT NEOPLASM OF THYROID GLAND (HCC): ICD-10-CM

## 2018-03-20 DIAGNOSIS — F51.01 PRIMARY INSOMNIA: ICD-10-CM

## 2018-03-20 DIAGNOSIS — R73.09 ABNORMAL GLUCOSE: ICD-10-CM

## 2018-03-20 DIAGNOSIS — G47.61 PERIODIC LIMB MOVEMENT DISORDER: ICD-10-CM

## 2018-03-20 DIAGNOSIS — Z00.00 ANNUAL PHYSICAL EXAM: Primary | ICD-10-CM

## 2018-03-20 PROCEDURE — 99396 PREV VISIT EST AGE 40-64: CPT | Performed by: FAMILY MEDICINE

## 2018-03-20 RX ORDER — LEVOTHYROXINE SODIUM 0.15 MG/1
150 TABLET ORAL DAILY
COMMUNITY

## 2018-03-20 RX ORDER — BETAMETHASONE VALERATE 0.1 %
LOTION (ML) TOPICAL
Qty: 180 ML | Refills: 0 | Status: SHIPPED | OUTPATIENT
Start: 2018-03-20 | End: 2018-03-28

## 2018-03-20 RX ORDER — RIZATRIPTAN BENZOATE 10 MG/1
10 TABLET ORAL AS NEEDED
Qty: 30 TABLET | Refills: 1 | Status: SHIPPED | OUTPATIENT
Start: 2018-03-20 | End: 2019-03-13

## 2018-03-20 NOTE — PATIENT INSTRUCTIONS
Labs due 2 months-- fasting -- lipids, hgba1c, chem panel      Consider shingle vaccine ( Shingrix)-- check with insurance  Re coverage      Continue present medications    Dermatology referral- Pt desires  clinic

## 2018-03-20 NOTE — PROGRESS NOTES
[facility] [place]  PROGRESS NOTE  Chief Complaint:   Patient presents with:  Physical      HPI:   This is a 59year old female coming in for annual physical  Pt due labs 2 month-- reviewed labs labs from Fabrizio    Migraine headaches-- once a month Hypothyroidism    • Sleep apnea      Past Surgical History:  No date: CHOLECYSTECTOMY  No date: COLONOSCOPY  No date: D & C  No date: OTHER SURGICAL HISTORY      Comment: fibroids   Social History:  Social History    Marital status: Single              Spo needed. Disp: 30 tablet Rfl: 1   Eszopiclone (LUNESTA) 3 MG Oral Tab Take 3 mg by mouth nightly as needed.  Disp: 90 tablet Rfl: 1   ATORVASTATIN 20 MG Oral Tab TAKE 1 TABLET NIGHTLY Disp: 90 tablet Rfl: 0   TRIAMTERENE-HCTZ 37.5-25 MG Oral Cap TAKE 1 CAPSU anemia, bleeding or bruising. LYMPHATICS:  Denies enlarged nodes  PSYCHIATRIC:  Denies depression or anxiety. ENDOCRINOLOGIC:  Denies excessive sweating, cold or heat intolerance, polyuria or polydipsia.   ALLERGIES:  Denies allergic response, history of hypercholesterolemia  stable    4. Obstructive sleep apnea syndrome  mananged by ROSELYN Soares    5. Hypoparathyroidism, unspecified hypoparathyroidism type ([facility]  Stable     6. Malignant neoplasm of thyroid gland (HCC)  stable    7.  Postoperative hypothyro AM    Patient/Caregiver Education: Patient/Caregiver Education: There are no barriers to learning. Medical education done. Outcome: Patient verbalizes understanding.  Patient is notified to call with any questions, complications, allergies, or worsening o

## 2018-03-28 ENCOUNTER — TELEPHONE (OUTPATIENT)
Dept: FAMILY MEDICINE CLINIC | Facility: CLINIC | Age: 58
End: 2018-03-28

## 2018-03-28 ENCOUNTER — OFFICE VISIT (OUTPATIENT)
Dept: FAMILY MEDICINE CLINIC | Facility: CLINIC | Age: 58
End: 2018-03-28

## 2018-03-28 VITALS
HEIGHT: 67 IN | SYSTOLIC BLOOD PRESSURE: 118 MMHG | TEMPERATURE: 98 F | WEIGHT: 270.81 LBS | DIASTOLIC BLOOD PRESSURE: 72 MMHG | BODY MASS INDEX: 42.51 KG/M2 | RESPIRATION RATE: 18 BRPM | HEART RATE: 76 BPM | OXYGEN SATURATION: 97 %

## 2018-03-28 DIAGNOSIS — J40 BRONCHITIS: Primary | ICD-10-CM

## 2018-03-28 PROCEDURE — 99214 OFFICE O/P EST MOD 30 MIN: CPT | Performed by: FAMILY MEDICINE

## 2018-03-28 RX ORDER — AZITHROMYCIN 250 MG/1
TABLET, FILM COATED ORAL
Qty: 6 TABLET | Refills: 0 | Status: SHIPPED | OUTPATIENT
Start: 2018-03-28 | End: 2018-04-19

## 2018-03-28 NOTE — PROGRESS NOTES
CHIEF COMPLAINT:   Patient presents with:  Fatigue  Cough  Chest Congestion      HPI:   Patria Jose is a 59year old female who presents to clinic today with complaints of cough and congestion for 5 days.   Patient feeling that it is not getting any be MCG Oral Cap Take 0.25 mcg by mouth daily. Disp:  Rfl:    Multiple Vitamin (MULTIVITAMINS) Oral Cap Take 1 capsule by mouth daily. Disp:  Rfl:    omega-3 fatty acids 1000 MG Oral Cap Take 1,000 mg by mouth 2 (two) times daily.    Disp:  Rfl:       Past THROAT: oral mucosa pink, moist. Posterior pharynx noteably erythematous No exudates. NECK: supple, non-tender  THYROID: no palpable mass or tenderness  LUNGS: clear to auscultation bilaterally, no wheezes or rhonchi.  Pt slightly sob with talking, no fo

## 2018-03-28 NOTE — PATIENT INSTRUCTIONS
Rest, fluids, hydrate,   Use mucinex DM for nasal congestin and cough. Vitamin c, zinc, and garlic to help your immune system. Tylenol and ibuprofen as appropriate. Call if not improving in 3-5 days to be re-seen.   Saline nasal spray or nasal rinses

## 2018-03-28 NOTE — TELEPHONE ENCOUNTER
Pt's sister was dx with pneumonia yesterday.     Pt has a low grade fever 99.9, sinus congestion and cough  which is getting better    Informed pt we have no appts today and suggested urgent care today or sick clinic in the morning but informed pt we don't

## 2018-03-28 NOTE — TELEPHONE ENCOUNTER
Informed pt she could either treat her sxs or schedule appt.     Future Appointments  Date Time Provider Lasha Tessy   3/28/2018 2:45 PM Hue Trujillo MD [facility] [place] [facility] [place]   4/19/2018 10:00 AM Adama Soares APN [facility] [place] [facility] [place]

## 2018-03-28 NOTE — TELEPHONE ENCOUNTER
Pt may treat her symptoms and monitor or be seen. If she would like appt today- I can work her in - have her come 0.

## 2018-04-19 ENCOUNTER — OFFICE VISIT (OUTPATIENT)
Dept: FAMILY MEDICINE CLINIC | Facility: CLINIC | Age: 58
End: 2018-04-19

## 2018-04-19 VITALS
DIASTOLIC BLOOD PRESSURE: 76 MMHG | RESPIRATION RATE: 16 BRPM | BODY MASS INDEX: 44 KG/M2 | WEIGHT: 278.63 LBS | TEMPERATURE: 97 F | SYSTOLIC BLOOD PRESSURE: 124 MMHG | HEART RATE: 72 BPM

## 2018-04-19 DIAGNOSIS — G47.61 PERIODIC LIMB MOVEMENT DISORDER: ICD-10-CM

## 2018-04-19 DIAGNOSIS — G47.33 OSA (OBSTRUCTIVE SLEEP APNEA): Primary | ICD-10-CM

## 2018-04-19 PROCEDURE — 99214 OFFICE O/P EST MOD 30 MIN: CPT | Performed by: NURSE PRACTITIONER

## 2018-04-19 RX ORDER — ESZOPICLONE 3 MG/1
3 TABLET, FILM COATED ORAL NIGHTLY PRN
Qty: 90 TABLET | Refills: 1 | Status: SHIPPED
Start: 2018-04-19 | End: 2018-09-06

## 2018-04-19 NOTE — PROGRESS NOTES
[facility]  [facility]        HPI:   This is a 59year old female coming in for Patient presents with:  Obstructive Sleep Apnea (IRENE): Sleep compliance f/u       HPI: present to review compliance.  Has changed to BiPap and feels History:  No date: CHOLECYSTECTOMY  No date: COLONOSCOPY  No date: D & C  No date: OTHER SURGICAL HISTORY      Comment: fibroids   Social History:  Social History Narrative    Sister with brain lymphoma now living with her in new home          Smoking stat MG-UNIT Oral Tab Take by mouth. 2-4 tabs per day Disp:  Rfl:    calciTRIOL 0.25 MCG Oral Cap Take 0.25 mcg by mouth daily. Disp:  Rfl:    Multiple Vitamin (MULTIVITAMINS) Oral Cap Take 1 capsule by mouth daily.    Disp:  Rfl:    omega-3 fatty acids 1000 M Encounters:  04/19/18 : 124/76  03/28/18 : 118/72  03/20/18 : 130/62        Vital signs reviewed. Physical Exam   Constitutional: She is oriented to person, place, and time. She appears well-developed and well-nourished.    HENT:   Head: Normocephalic and driving when sleepy. COMPLIANCE is required by insurance for 4 hours a night most nights of the week. Recommend weight loss, and maintain and optimal BMI with Exercise 30 minutes most days of the week to target heart rate .      Advised patient to ross

## 2018-04-19 NOTE — PATIENT INSTRUCTIONS
Settings changed per Dr. Suyapa Clarke. Recheck one month - sooner if needed. Refill done for Lunesta. Warned if still with sleep apnea and not using Bipap has 7 fold increased risk and heart attack, stroke, abnormal heart rhythm, and death.   Increased ri

## 2018-04-21 RX ORDER — POTASSIUM CHLORIDE 20 MEQ/1
TABLET, EXTENDED RELEASE ORAL
Qty: 90 TABLET | Refills: 1 | Status: SHIPPED | OUTPATIENT
Start: 2018-04-21 | End: 2018-10-17

## 2018-04-21 NOTE — TELEPHONE ENCOUNTER
RF request from [employer] for Potassium 20 MEQ #90. Please advise. Future appt:     Your appointments     Date & Time Appointment Department [facility])    May 22, 2018  8:15 AM CDT Laboratory Visit with REF Kari Cline Reference Lab (EDW Ref

## 2018-04-23 ENCOUNTER — OFFICE VISIT (OUTPATIENT)
Dept: FAMILY MEDICINE CLINIC | Facility: CLINIC | Age: 58
End: 2018-04-23

## 2018-04-23 ENCOUNTER — HOSPITAL ENCOUNTER (OUTPATIENT)
Dept: GENERAL RADIOLOGY | Age: 58
Discharge: HOME | End: 2018-04-23
Attending: NURSE PRACTITIONER
Payer: COMMERCIAL

## 2018-04-23 VITALS
TEMPERATURE: 98 F | SYSTOLIC BLOOD PRESSURE: 126 MMHG | HEART RATE: 64 BPM | RESPIRATION RATE: 16 BRPM | WEIGHT: 279 LBS | BODY MASS INDEX: 44 KG/M2 | DIASTOLIC BLOOD PRESSURE: 84 MMHG

## 2018-04-23 DIAGNOSIS — M54.50 ACUTE RIGHT-SIDED LOW BACK PAIN WITHOUT SCIATICA: ICD-10-CM

## 2018-04-23 DIAGNOSIS — M54.50 ACUTE RIGHT-SIDED LOW BACK PAIN WITHOUT SCIATICA: Primary | ICD-10-CM

## 2018-04-23 PROCEDURE — 72110 X-RAY EXAM L-2 SPINE 4/>VWS: CPT | Performed by: NURSE PRACTITIONER

## 2018-04-23 PROCEDURE — 99214 OFFICE O/P EST MOD 30 MIN: CPT | Performed by: NURSE PRACTITIONER

## 2018-04-23 RX ORDER — METHYLPREDNISOLONE 4 MG/1
TABLET ORAL
Qty: 1 KIT | Refills: 0 | Status: SHIPPED | OUTPATIENT
Start: 2018-04-23 | End: 2018-12-03 | Stop reason: ALTCHOICE

## 2018-04-23 RX ORDER — IBUPROFEN 200 MG
200 TABLET ORAL EVERY 6 HOURS PRN
COMMUNITY
End: 2019-03-26

## 2018-04-23 RX ORDER — CYCLOBENZAPRINE HCL 10 MG
10 TABLET ORAL NIGHTLY
Qty: 30 TABLET | Refills: 1 | Status: SHIPPED | OUTPATIENT
Start: 2018-04-23 | End: 2018-05-13

## 2018-04-23 RX ORDER — NAPROXEN 500 MG/1
500 TABLET ORAL 2 TIMES DAILY WITH MEALS
Qty: 60 TABLET | Refills: 1 | Status: SHIPPED | OUTPATIENT
Start: 2018-04-23 | End: 2018-12-03 | Stop reason: ALTCHOICE

## 2018-04-23 NOTE — PROGRESS NOTES
Loida Lopes is a 59year old female.   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  Disp: 60 tablet Rfl: 1   POTASSIUM CHLORIDE ER 20 MEQ Oral Tab CR TAKE 1 TABLET DAILY Disp: 90 tablet Rfl: 1   Eszopiclone (LUNESTA) 3 MG Oral Tab Take 3 mg by mouth nightly as neede Hypertension Mother    • Cancer Maternal Grandfather    • Cancer Sister    • Diabetes Sister    • Hypertension Sister         Allergies    Contrast  [Radiolog*        Comment:Other reaction(s): hives  Procaine                    Comment:Other reaction(s): Sig: Take 1 tablet (500 mg total) by mouth 2 (two) times daily with meals. X-ray–increased arthritis, disc space narrowing at multiple levels of lumbar- worst at L5-S1  Imaging & Consults:  PHYSICAL THERAPY EXTERNAL    No Follow-up on file.   Markos

## 2018-05-07 RX ORDER — DESVENLAFAXINE 50 MG/1
TABLET, EXTENDED RELEASE ORAL
Qty: 90 TABLET | Refills: 1 | Status: SHIPPED | OUTPATIENT
Start: 2018-05-07 | End: 2018-11-02

## 2018-05-07 RX ORDER — METOPROLOL SUCCINATE 25 MG/1
TABLET, EXTENDED RELEASE ORAL
Qty: 90 TABLET | Refills: 1 | Status: SHIPPED | OUTPATIENT
Start: 2018-05-07 | End: 2018-11-02

## 2018-05-07 NOTE — TELEPHONE ENCOUNTER
RF request from [employer] for Desvenlafaxine 50 MG #90 and Metoprolol Succinate 25 MG #90. Please advise. Future appt:     Your appointments     Date & Time Appointment Department [facility])    May 22, 2018  8:15 AM CDT Laboratory Visit with REF

## 2018-05-15 RX ORDER — TRIAMTERENE AND HYDROCHLOROTHIAZIDE 37.5; 25 MG/1; MG/1
CAPSULE ORAL
Qty: 90 CAPSULE | Refills: 1 | Status: SHIPPED | OUTPATIENT
Start: 2018-05-15 | End: 2018-11-11

## 2018-05-15 NOTE — TELEPHONE ENCOUNTER
Future appt: Your appointments     Date & Time Appointment Department [facility])    May 22, 2018  8:15 AM CDT Laboratory Visit with REF Sonia Gil Reference Lab (EDW Ref Lab [facility])    May 22, 2018  8:30 AM CDT Follow up - Extended with DANYA Flores [address] [facility] [address], [facility] ([place])        [address] [facility] [address] [facility] [address], [address] [place] [address] [place]  [address] [place] [id number] [address] [facility] Reference Lab  EDW Ref Lab [address] [place] [address]  [phone]        Last Appointment:  3/28/2018; No f/u recommended    Cholesterol, Total (mg/dL)   Date Value   01/16/2018 172   ----------  HDL Cholesterol (mg/dL)   Date Value   01/16/2018 56   ----------  LDL Cholesterol (mg/dL)   Date Value   01/16/2018 66   ----------  Triglycerides (mg/dL)   Date Value   01/16/2018 249 (H)   ----------    Lab Results  Component Value Date    (H) 01/16/2018   A1C 6.2 (H) 01/16/2018     No results found for: T4F, TSH, TSHT4    Last RF:  2/14/2018    No Follow-up on file.

## 2018-05-17 ENCOUNTER — PATIENT MESSAGE (OUTPATIENT)
Dept: FAMILY MEDICINE CLINIC | Facility: CLINIC | Age: 58
End: 2018-05-17

## 2018-05-18 NOTE — TELEPHONE ENCOUNTER
Donaciano Koyanagi 5/18/2018 6:56 AM CDT        ----- Message -----  From: Iram Mercado  Sent: 5/17/2018 5:34 PM  To:  [facility] Clinical Staff  Subject: Other     Hi Dr Mykel Ocasio, I wanted to make sure you were in the loop with where I am being referred to

## 2018-05-21 ENCOUNTER — TELEPHONE (OUTPATIENT)
Dept: FAMILY MEDICINE CLINIC | Facility: CLINIC | Age: 58
End: 2018-05-21

## 2018-05-21 DIAGNOSIS — R73.09 ABNORMAL GLUCOSE: ICD-10-CM

## 2018-05-21 DIAGNOSIS — E78.5 HYPERLIPIDEMIA, UNSPECIFIED HYPERLIPIDEMIA TYPE: Primary | ICD-10-CM

## 2018-05-21 NOTE — TELEPHONE ENCOUNTER
Future appt:     Your appointments     Date & Time Appointment Department [facility])    May 22, 2018  8:15 AM CDT Laboratory Visit with REF Starlette Nine Reference Lab (EDW Ref Lab [facility])    May 22, 2018  8:30 AM CDT Follow up - Extended with Rodger

## 2018-05-22 ENCOUNTER — TELEPHONE (OUTPATIENT)
Dept: FAMILY MEDICINE CLINIC | Facility: CLINIC | Age: 58
End: 2018-05-22

## 2018-05-22 ENCOUNTER — APPOINTMENT (OUTPATIENT)
Dept: LAB | Age: 58
End: 2018-05-22
Attending: FAMILY MEDICINE
Payer: COMMERCIAL

## 2018-05-22 ENCOUNTER — OFFICE VISIT (OUTPATIENT)
Dept: FAMILY MEDICINE CLINIC | Facility: CLINIC | Age: 58
End: 2018-05-22

## 2018-05-22 VITALS
HEIGHT: 67 IN | SYSTOLIC BLOOD PRESSURE: 128 MMHG | HEART RATE: 60 BPM | BODY MASS INDEX: 43.03 KG/M2 | WEIGHT: 274.19 LBS | DIASTOLIC BLOOD PRESSURE: 88 MMHG | TEMPERATURE: 97 F

## 2018-05-22 DIAGNOSIS — G47.33 OSA (OBSTRUCTIVE SLEEP APNEA): Primary | ICD-10-CM

## 2018-05-22 DIAGNOSIS — E78.5 HYPERLIPIDEMIA, UNSPECIFIED HYPERLIPIDEMIA TYPE: ICD-10-CM

## 2018-05-22 DIAGNOSIS — R73.09 ABNORMAL GLUCOSE: ICD-10-CM

## 2018-05-22 PROBLEM — C43.9 MELANOMA (HCC): Status: ACTIVE | Noted: 2018-05-21

## 2018-05-22 PROBLEM — M19.90 ARTHRITIS: Status: ACTIVE | Noted: 2018-04-23

## 2018-05-22 PROCEDURE — 83036 HEMOGLOBIN GLYCOSYLATED A1C: CPT | Performed by: FAMILY MEDICINE

## 2018-05-22 PROCEDURE — 80061 LIPID PANEL: CPT | Performed by: FAMILY MEDICINE

## 2018-05-22 PROCEDURE — 80053 COMPREHEN METABOLIC PANEL: CPT | Performed by: FAMILY MEDICINE

## 2018-05-22 PROCEDURE — 99213 OFFICE O/P EST LOW 20 MIN: CPT | Performed by: NURSE PRACTITIONER

## 2018-05-22 PROCEDURE — 36415 COLL VENOUS BLD VENIPUNCTURE: CPT | Performed by: FAMILY MEDICINE

## 2018-05-22 RX ORDER — FLUOROURACIL 50 MG/G
CREAM TOPICAL
Refills: 0 | COMMUNITY
Start: 2018-05-16 | End: 2018-12-11

## 2018-05-22 RX ORDER — BETAMETHASONE VALERATE 0.1 %
LOTION (ML) TOPICAL
COMMUNITY
Start: 2018-05-07 | End: 2018-12-11

## 2018-05-22 NOTE — PATIENT INSTRUCTIONS
Continue BiPAP therapy. Call when need refill of Lunesta. Recheck in 8 months - sooner if needed. Warned if still with sleep apnea and not using CPAP has 7 fold increased risk and heart attack, stroke, abnormal heart rhythm, and death.   Increased r

## 2018-05-22 NOTE — PROGRESS NOTES
[facility]  [facility]        HPI:   This is a 59year old female coming in for Patient presents with:   Follow - Up: IRENE follow up      HPI:   States that when she first first puts on the mask the machine wants to breath quicker SURGICAL HISTORY      Comment: fibroids   Social History:  Social History Narrative    Sister with brain lymphoma now living with her in new home          Smoking status: Former Smoker                                                              Packs/day: tablet (10 mg total) by mouth as needed. Disp: 30 tablet Rfl: 1   ATORVASTATIN 20 MG Oral Tab TAKE 1 TABLET NIGHTLY Disp: 90 tablet Rfl: 0   calciTRIOL 0.25 MCG Oral Cap Take 0.25 mcg by mouth daily.    Disp:  Rfl:    Multiple Vitamin (MULTIVITAMINS) Oral C EXAM:   /88 (BP Location: Left arm, Patient Position: Sitting, Cuff Size: adult)   Pulse 60   Temp 97 °F (36.1 °C) (Tympanic)   Ht 67\"   Wt 274 lb 3.2 oz   BMI 42.95 kg/m²  Estimated body mass index is 42.95 kg/m² as calculated from the follow and death. Increased risk of driving accidents. Advised to refrain from driving when sleepy.          Advised if still with sleep apnea and not using CPAP has a  7 fold increase in risk of heart attack, stroke, abnormal heart rhythm  and death,  increased

## 2018-05-22 NOTE — TELEPHONE ENCOUNTER
Pt seen per Cony today for sleep appt, wanted to update PCP  Pt states she had recent appt with derm- Dr. Mikal Guo- at [facility]. 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     Pt wa

## 2018-06-08 ENCOUNTER — OFFICE VISIT (OUTPATIENT)
Dept: FAMILY MEDICINE CLINIC | Facility: CLINIC | Age: 58
End: 2018-06-08

## 2018-06-08 VITALS
RESPIRATION RATE: 16 BRPM | DIASTOLIC BLOOD PRESSURE: 64 MMHG | HEART RATE: 76 BPM | SYSTOLIC BLOOD PRESSURE: 112 MMHG | HEIGHT: 67 IN | WEIGHT: 277.38 LBS | TEMPERATURE: 99 F | BODY MASS INDEX: 43.54 KG/M2

## 2018-06-08 DIAGNOSIS — E78.5 HYPERLIPIDEMIA, UNSPECIFIED HYPERLIPIDEMIA TYPE: ICD-10-CM

## 2018-06-08 DIAGNOSIS — C43.62 MALIGNANT MELANOMA OF LEFT UPPER EXTREMITY INCLUDING SHOULDER (HCC): Primary | ICD-10-CM

## 2018-06-08 DIAGNOSIS — C44.622 SQUAMOUS CELL CANCER OF SKIN OF UPPER ARM, RIGHT: ICD-10-CM

## 2018-06-08 DIAGNOSIS — Z01.419 GYNECOLOGIC EXAM NORMAL: ICD-10-CM

## 2018-06-08 PROCEDURE — 99214 OFFICE O/P EST MOD 30 MIN: CPT | Performed by: FAMILY MEDICINE

## 2018-06-08 PROCEDURE — 88175 CYTOPATH C/V AUTO FLUID REDO: CPT | Performed by: FAMILY MEDICINE

## 2018-06-08 PROCEDURE — 87624 HPV HI-RISK TYP POOLED RSLT: CPT | Performed by: FAMILY MEDICINE

## 2018-06-08 RX ORDER — ONDANSETRON 4 MG/1
TABLET, ORALLY DISINTEGRATING ORAL
COMMUNITY
Start: 2018-06-06 | End: 2018-12-03

## 2018-06-08 RX ORDER — MELOXICAM 15 MG/1
TABLET ORAL AS NEEDED
COMMUNITY
Start: 2018-06-06 | End: 2020-08-27

## 2018-06-08 RX ORDER — CEPHALEXIN 500 MG/1
CAPSULE ORAL
COMMUNITY
Start: 2018-06-06 | End: 2018-12-03 | Stop reason: ALTCHOICE

## 2018-06-08 RX ORDER — CEPHALEXIN 500 MG/1
500 CAPSULE ORAL
COMMUNITY
Start: 2018-06-06 | End: 2018-06-11

## 2018-06-08 RX ORDER — ATORVASTATIN CALCIUM 20 MG/1
TABLET, FILM COATED ORAL
Qty: 90 TABLET | Refills: 3 | Status: SHIPPED | OUTPATIENT
Start: 2018-06-08 | End: 2019-06-02

## 2018-06-08 RX ORDER — ONDANSETRON 4 MG/1
4 TABLET, ORALLY DISINTEGRATING ORAL
COMMUNITY
Start: 2018-06-06 | End: 2018-12-03

## 2018-06-08 RX ORDER — ACETAMINOPHEN AND CODEINE PHOSPHATE 300; 30 MG/1; MG/1
TABLET ORAL
COMMUNITY
Start: 2018-06-06 | End: 2018-12-03 | Stop reason: ALTCHOICE

## 2018-06-08 NOTE — PROGRESS NOTES
[facility]  PROGRESS NOTE  Chief Complaint:   Patient presents with:  Pap  Follow - Up: has been referred to multiple physicians and wants to inform of findings      HPI:   This is a 59year old female coming in for medical f.u    Pt wit -HEMOGLOBIN A1C   Result Value Ref Range   HgbA1C 6.2 (H) <5.7 %   Estimated Average Glucose 131 (H) 68 - 126 mg/dL       Wt Readings from Last 6 Encounters:  06/08/18 : 277 lb 6.4 oz  05/22/18 : 274 lb 3.2 oz  04/23/18 : 279 lb  04/19/18 : 278 lb 9.6 oz Dispersible Take 4 mg by mouth. Disp:  Rfl:    ondansetron 4 MG Oral Tablet Dispersible  Disp:  Rfl:    Meloxicam 15 MG Oral Tab  Disp:  Rfl:    cephALEXin 500 MG Oral Cap Take 500 mg by mouth.  Disp:  Rfl:    Acetaminophen-Codeine #3 300-30 MG Oral Tab  Luly Jimenez 1,000 mg by mouth 2 (two) times daily.    Disp:  Rfl:    ATORVASTATIN 20 MG Oral Tab TAKE 1 TABLET NIGHTLY (APPOINTMENT DUE) Disp: 90 tablet Rfl: 3      Counseling given: Not Answered       REVIEW OF SYSTEMS:   CONSTITUTIONAL:  Denies unusual weight gain/lo Normocephalic, atraumatic Eyes: EOMI, PERRLA, no scleral icterus, conjunctivae clear bilaterally, no eye discharge Ears: External normal. Nose: patent, no nasal discharge Throat:  No tonsillar erythema or exudate.   Mouth:  No oral lesions or ulcerations, g arm, right        Problem List:  Patient Active Problem List:     Abnormal glucose     Abnormal mammogram     Actinic keratosis     Malignant neoplasm of thyroid gland (HCC)     Gingival and periodontal disease     B12 deficiency     Headache     Routine g

## 2018-06-08 NOTE — TELEPHONE ENCOUNTER
Future appt:     Your appointments     Date & Time Appointment Department [facility])    Jun 08, 2018  2:30 PM CDT Exam - Established Patient with Rose Cao MD 25 [facility] ([facility])    Dec 11, 2018 11:00 AM CST Sleep Follow Up with 55 Alesha Lester, MaciejDANYA Kimble 25 [facility] [place] ([facility])        25 [facility], [address] [place]  [place] [address] [id number] [phone]        Last Appointment:  3/28/2018    Medication last refilled on 3/10/18    Cholesterol, Total (mg/dL)   Date Value   05/22/2018 143   ----------  HDL Cholesterol (mg/dL)   Date Value   05/22/2018 50   ----------  LDL Cholesterol (mg/dL)   Date Value   05/22/2018 57   ----------  Triglycerides (mg/dL)   Date Value   05/22/2018 182 (H)   ----------    Lab Results  Component Value Date    (H) 05/22/2018   A1C 6.2 (H) 05/22/2018

## 2018-06-08 NOTE — PATIENT INSTRUCTIONS
Pap today    Continue care with plastics and dermatology    OK for referral for genetic testing-- pt to call information for referral    Recheck 6 [facility]- labs and medical f.u

## 2018-06-09 ENCOUNTER — PATIENT MESSAGE (OUTPATIENT)
Dept: FAMILY MEDICINE CLINIC | Facility: CLINIC | Age: 58
End: 2018-06-09

## 2018-06-09 DIAGNOSIS — C43.62 MALIGNANT MELANOMA OF LEFT UPPER EXTREMITY INCLUDING SHOULDER (HCC): Primary | ICD-10-CM

## 2018-06-09 DIAGNOSIS — Z84.89 FAMILY HISTORY OF GENETIC DISORDER: ICD-10-CM

## 2018-06-09 NOTE — TELEPHONE ENCOUNTER
From: Edilma Apt  To: Neeta Castillo MD  Sent: 6/9/2018 11:48 AM CDT  Subject: Non-Urgent Malina Rodriguez [place],  Here is the name of the genetic counselor I would like to be refereed to.    [address] Counselor at [facility]

## 2018-06-13 ENCOUNTER — TELEPHONE (OUTPATIENT)
Dept: FAMILY MEDICINE CLINIC | Facility: CLINIC | Age: 58
End: 2018-06-13

## 2018-06-13 NOTE — TELEPHONE ENCOUNTER
----- Message from Melba De León MD sent at 6/13/2018 11:49 AM CDT -----  Normal Pap smear. Negative HPV screen. Patient had follow a path in five years sooner if concerns.

## 2018-07-02 NOTE — TELEPHONE ENCOUNTER
Pt states she has been using steroid cream for more than 20 years for her psoriasis. Pt states her sister passed away recently- pt planning a , hoping for a refill at this time.

## 2018-07-02 NOTE — TELEPHONE ENCOUNTER
Future appt:     Your appointments     Date & Time Appointment Department [facility])    Dec 11, 2018 11:00 AM CST Sleep Follow Up with DANYA King 25 [facility] ([facility])        [employer]

## 2018-07-02 NOTE — TELEPHONE ENCOUNTER
Refill request for betamethasone cream.  Please asked patient what she is using this for as this is typically not a long-term medication. Using this medication for more than 2 weeks can cause damage to the skin.

## 2018-09-06 DIAGNOSIS — G47.33 OSA (OBSTRUCTIVE SLEEP APNEA): ICD-10-CM

## 2018-09-06 DIAGNOSIS — G47.61 PERIODIC LIMB MOVEMENT DISORDER: ICD-10-CM

## 2018-09-07 RX ORDER — ESZOPICLONE 3 MG/1
3 TABLET, FILM COATED ORAL NIGHTLY PRN
Qty: 90 TABLET | Refills: 1 | Status: SHIPPED
Start: 2018-09-07 | End: 2018-12-11

## 2018-09-07 NOTE — TELEPHONE ENCOUNTER
Future appt:     Your appointments     Date & Time Appointment Department [facility])    Dec 11, 2018 11:00 AM CST Sleep Follow Up with DANYA Mccauley 25 [facility] ([facility])        [employer]

## 2018-09-07 NOTE — TELEPHONE ENCOUNTER
From: Denny Coronado  Sent: 9/6/2018 11:37 PM CDT  Subject: Medication Renewal Request    Denny Coronado would like a refill of the following medications:     Eszopiclone (LUNESTA) 3 MG Oral Tab [DANYA Roque]   Patient Comment: Can I please get t

## 2018-09-19 LAB
ANION GAP: 14
BUN: 16
CALCIUM: 8.3
CHLORIDE: 100
CO2: 27
CREATININE: 0.8 MG/DL
GFR AFRICAN AMERICAN: 94
GFR NON-AFRICAN AMERICAN: 78
GLUCOSE BLOOD: 97
POTASSIUM: 3.7
SODIUM: 141
TSH: 0.94 UIU/ML
VITAMIN D, 25-OH, TOTAL: 33.6

## 2018-10-18 RX ORDER — POTASSIUM CHLORIDE 20 MEQ/1
TABLET, EXTENDED RELEASE ORAL
Qty: 90 TABLET | Refills: 0 | Status: SHIPPED | OUTPATIENT
Start: 2018-10-18 | End: 2019-01-15

## 2018-10-18 NOTE — TELEPHONE ENCOUNTER
RF request from [employer] for PotassiChloride 20 [address] #90. Please advise. Future appt: Your appointments     Date & Time Appointment Department [facility])    Dec 11, 2018 11:00 AM CST Sleep Follow Up with DANYA Kingsley 25 [facility], [place] ([facility])        25 [facility] [place]  [place] [id number] [phone]        Last Appointment:  6/8/2018; Recheck 6 [facility]- labs and medical f.u    Cholesterol, Total (mg/dL)   Date Value   05/22/2018 143     HDL Cholesterol (mg/dL)   Date Value   05/22/2018 50     LDL Cholesterol (mg/dL)   Date Value   05/22/2018 57     Triglycerides (mg/dL)   Date Value   05/22/2018 182 (H)     Lab Results   Component Value Date     (H) 05/22/2018    A1C 6.2 (H) 05/22/2018     Lab Results   Component Value Date    TSH 0.94 09/18/2018     Last RF:  4/21/2018    No Follow-up on file.

## 2018-11-03 NOTE — TELEPHONE ENCOUNTER
Future appt: Your appointments     Date & Time Appointment Department [facility])    Dec 11, 2018 11:00 AM CST Sleep Follow Up [facility]        25 [facility], [facility] [place] [address] [address]  [phone]        Last Appointment:  6/8/2018-  Pt due for labs and medical f/u in 6 months  Left message for pt- due for appt's in December, asked for pt to call back to make her appt's    Both Metoprolol and Desvenlafaxine last refilled 5/7/18 for #90 with one refill    Cholesterol, Total (mg/dL)   Date Value   05/22/2018 143     HDL Cholesterol (mg/dL)   Date Value   05/22/2018 50     LDL Cholesterol (mg/dL)   Date Value   05/22/2018 57     Triglycerides (mg/dL)   Date Value   05/22/2018 182 (H)     Lab Results   Component Value Date     (H) 05/22/2018    A1C 6.2 (H) 05/22/2018     Lab Results   Component Value Date    TSH 0.94 09/18/2018       No Follow-up on file.

## 2018-11-04 RX ORDER — METOPROLOL SUCCINATE 25 MG/1
TABLET, EXTENDED RELEASE ORAL
Qty: 90 TABLET | Refills: 0 | Status: SHIPPED | OUTPATIENT
Start: 2018-11-04 | End: 2019-02-01

## 2018-11-04 RX ORDER — DESVENLAFAXINE SUCCINATE 50 MG/1
TABLET, EXTENDED RELEASE ORAL
Qty: 90 TABLET | Refills: 0 | Status: SHIPPED | OUTPATIENT
Start: 2018-11-04 | End: 2019-02-01

## 2018-11-12 RX ORDER — TRIAMTERENE AND HYDROCHLOROTHIAZIDE 37.5; 25 MG/1; MG/1
CAPSULE ORAL
Qty: 90 CAPSULE | Refills: 1 | Status: SHIPPED | OUTPATIENT
Start: 2018-11-12 | End: 2019-05-11

## 2018-11-12 NOTE — TELEPHONE ENCOUNTER
RF request from [employer] for Triamterene-HCTZ 37.5-25 MG #90. Please advise. Future appt:     Your appointments     Date & Time Appointment Department [facility])    Dec 11, 2018 11:00 AM CST Sleep Follow Up with DANYA Ibarra [facility]

## 2018-12-03 ENCOUNTER — OFFICE VISIT (OUTPATIENT)
Dept: FAMILY MEDICINE CLINIC | Facility: CLINIC | Age: 58
End: 2018-12-03
Payer: COMMERCIAL

## 2018-12-03 VITALS
SYSTOLIC BLOOD PRESSURE: 118 MMHG | HEIGHT: 67 IN | OXYGEN SATURATION: 98 % | BODY MASS INDEX: 44.1 KG/M2 | WEIGHT: 281 LBS | DIASTOLIC BLOOD PRESSURE: 82 MMHG | RESPIRATION RATE: 18 BRPM | TEMPERATURE: 97 F | HEART RATE: 64 BPM

## 2018-12-03 DIAGNOSIS — H65.01 RIGHT ACUTE SEROUS OTITIS MEDIA, RECURRENCE NOT SPECIFIED: Primary | ICD-10-CM

## 2018-12-03 PROBLEM — Z71.83 ENCOUNTER FOR NONPROCREATIVE GENETIC COUNSELING: Status: ACTIVE | Noted: 2018-07-31

## 2018-12-03 PROCEDURE — 99214 OFFICE O/P EST MOD 30 MIN: CPT | Performed by: NURSE PRACTITIONER

## 2018-12-03 RX ORDER — AMOXICILLIN 875 MG/1
875 TABLET, COATED ORAL 2 TIMES DAILY
Qty: 20 TABLET | Refills: 0 | Status: SHIPPED | OUTPATIENT
Start: 2018-12-03 | End: 2018-12-13

## 2018-12-03 NOTE — PROGRESS NOTES
CHIEF COMPLAINT:   Patient presents with:  Runny Nose  Ear Pain: Both ears      HPI:   Raoul Palafox is a 59year old female who presents to clinic today with complaints of runny nose and pain in both ears since October 8th.  Blowing nose a lot but still Take 81 mg by mouth daily. Disp:  Rfl:    Rizatriptan Benzoate 10 MG Oral Tab Take 1 tablet (10 mg total) by mouth as needed. Disp: 30 tablet Rfl: 1   Calcium Carbonate-Vitamin D (CALCIUM-D) 600-400 MG-UNIT Oral Tab Take by mouth.  2-4 tabs per day Disp:  R Breathing is non labored. Has cough.   CARDIO: RRR without murmur  SKIN: no rashes,no suspicious lesions  ASSESSMENT AND PLAN:   Opal Hicks is a 59year old female who presents with ear problems symptoms are consistent with  ASSESSMENT:  Right acute ser

## 2018-12-03 NOTE — PATIENT INSTRUCTIONS
Rest, increase fluids, salt water gargles ,ludwin pot (use distilled water) or saline nasal spray, Mucinex-DM, Tylenol/Ibuprofen, vicks vapo rub, steam,  follow up if symptoms persist or increase.

## 2018-12-11 ENCOUNTER — OFFICE VISIT (OUTPATIENT)
Dept: FAMILY MEDICINE CLINIC | Facility: CLINIC | Age: 58
End: 2018-12-11
Payer: COMMERCIAL

## 2018-12-11 VITALS
SYSTOLIC BLOOD PRESSURE: 120 MMHG | WEIGHT: 280.81 LBS | RESPIRATION RATE: 16 BRPM | HEIGHT: 67 IN | TEMPERATURE: 97 F | HEART RATE: 72 BPM | BODY MASS INDEX: 44.07 KG/M2 | DIASTOLIC BLOOD PRESSURE: 84 MMHG

## 2018-12-11 DIAGNOSIS — G47.33 OSA (OBSTRUCTIVE SLEEP APNEA): Primary | ICD-10-CM

## 2018-12-11 DIAGNOSIS — G47.61 PERIODIC LIMB MOVEMENT DISORDER: ICD-10-CM

## 2018-12-11 PROCEDURE — 99214 OFFICE O/P EST MOD 30 MIN: CPT | Performed by: NURSE PRACTITIONER

## 2018-12-11 RX ORDER — ESZOPICLONE 3 MG/1
3 TABLET, FILM COATED ORAL NIGHTLY PRN
Qty: 90 TABLET | Refills: 1 | Status: SHIPPED
Start: 2018-12-11 | End: 2019-06-05

## 2018-12-11 NOTE — PROGRESS NOTES
[facility]        HPI:   This is a 59year old female coming in for Patient presents with:  Obstructive Sleep Apnea (IRENE): Sleep compliance f/u      HPI:     Patient is present to review her sleep therapy.  States julius • Cancer [facility])     thyroid cancer   • Essential hypertension    • Hypothyroidism    • Sleep apnea      Past Surgical History:   Procedure Laterality Date   • CHOLECYSTECTOMY     • COLONOSCOPY     • D & C     • OTHER SURGICAL HISTORY      fibroids      So Oral Tab as needed. Disp:  Rfl:    ibuprofen 200 MG Oral Tab Take 200 mg by mouth every 6 (six) hours as needed for Pain.  Takes four capsule by mouth twice daily as needed for pain Disp:  Rfl:    Levothyroxine Sodium (SYNTHROID) 150 MCG Oral Tab Take 150 Positive for sleep disturbance.        EXAM:   /84   Pulse 72   Temp 97 °F (36.1 °C) (Tympanic)   Resp 16   Ht 67\"   Wt 280 lb 12.8 oz   BMI 43.98 kg/m²  Estimated body mass index is 43.98 kg/m² as calculated from the following:    Height as of this Increase IPAP to 22. Continue current medications. Continue using sleep therapy. Recheck in 2 months - sooner if needed.     Warned if still with sleep apnea and not using CPAP has 7 fold increased risk and heart attack, stroke, abnormal heart rhyt

## 2018-12-11 NOTE — PATIENT INSTRUCTIONS
Increase IPAP to 22. Continue current medications. Continue using sleep therapy. Recheck in 2 months - sooner if needed.     Warned if still with sleep apnea and not using CPAP has 7 fold increased risk and heart attack, stroke, abnormal heart rhythm

## 2019-01-16 RX ORDER — POTASSIUM CHLORIDE 20 MEQ/1
TABLET, EXTENDED RELEASE ORAL
Qty: 90 TABLET | Refills: 0 | Status: SHIPPED | OUTPATIENT
Start: 2019-01-16 | End: 2019-04-15

## 2019-01-16 NOTE — TELEPHONE ENCOUNTER
Future appt:     Your appointments     Date & Time Appointment Department [facility])    Feb 05, 2019 11:00 AM CST Sleep Follow Up with Amarjit Johnson, [address] [place], [place] [address] ([place])        [address] [place] [address]

## 2019-01-16 NOTE — TELEPHONE ENCOUNTER
Pt scheduled for med f/u on 2/8. Pt stated that she would come fasting to appt.     Future Appointments   Date Time Provider Lasha Still   2/5/2019 11:00 AM Dory Soares APRN [facility] [place]   2/8/2019 10:00 AM Bill Batres MD E

## 2019-01-16 NOTE — TELEPHONE ENCOUNTER
[id number] Deepa Steele for refill. Pt labs due and she is due for medical f.u. According to note pt is choosing to wait til March.

## 2019-02-02 RX ORDER — DESVENLAFAXINE 50 MG/1
TABLET, EXTENDED RELEASE ORAL
Qty: 90 TABLET | Refills: 0 | Status: SHIPPED | OUTPATIENT
Start: 2019-02-02 | End: 2019-05-03

## 2019-02-02 RX ORDER — METOPROLOL SUCCINATE 25 MG/1
TABLET, EXTENDED RELEASE ORAL
Qty: 90 TABLET | Refills: 0 | Status: SHIPPED | OUTPATIENT
Start: 2019-02-02 | End: 2019-05-03

## 2019-02-02 NOTE — TELEPHONE ENCOUNTER
Future appt:     Your appointments     Date & Time Appointment Department [facility])    Feb 05, 2019 11:00 AM CST Sleep Follow Up with Dung Ruiz, [address] [place] [address], [place] ([place])    Feb 08, 2019 10:00 AM

## 2019-02-05 ENCOUNTER — OFFICE VISIT (OUTPATIENT)
Dept: FAMILY MEDICINE CLINIC | Facility: CLINIC | Age: 59
End: 2019-02-05
Payer: COMMERCIAL

## 2019-02-05 VITALS
TEMPERATURE: 97 F | DIASTOLIC BLOOD PRESSURE: 80 MMHG | SYSTOLIC BLOOD PRESSURE: 120 MMHG | HEART RATE: 64 BPM | RESPIRATION RATE: 16 BRPM | BODY MASS INDEX: 44 KG/M2 | WEIGHT: 281.81 LBS

## 2019-02-05 DIAGNOSIS — G47.33 OSA (OBSTRUCTIVE SLEEP APNEA): ICD-10-CM

## 2019-02-05 DIAGNOSIS — G47.00 INSOMNIA, UNSPECIFIED TYPE: Primary | ICD-10-CM

## 2019-02-05 PROCEDURE — 99213 OFFICE O/P EST LOW 20 MIN: CPT | Performed by: NURSE PRACTITIONER

## 2019-02-05 NOTE — PATIENT INSTRUCTIONS
Continue sleep therapy. Follow-up in 6 months - sooner if needed. Advised if still with sleep apnea and not using CPAP has a  7 fold increase in risk of heart attack, stroke, abnormal heart rhythm  and death,  increased risk of driving accidents.    A

## 2019-02-05 NOTE — PROGRESS NOTES
[facility]        HPI:   This is a 59year old female coming in for Patient presents with:  Obstructive Sleep Apnea (IRENE): Sleep compliance f/u      HPI:     Doing well with sleep therapy.  Will have a dry mouth at ti Sister with brain lymphoma now living with her in new home    Social History    Tobacco Use      Smoking status: Former Smoker      Smokeless tobacco: Never Used    Alcohol use:  Yes      Alcohol/week: 0.0 oz      Comment: 1-2 drinks every 6 months-mixed Disp: 30 tablet Rfl: 1   calciTRIOL 0.25 MCG Oral Cap Take 0.25 mcg by mouth daily. Disp:  Rfl:    Multiple Vitamin (MULTIVITAMINS) Oral Cap Take 1 capsule by mouth daily.    Disp:  Rfl:    omega-3 fatty acids 1000 MG Oral Cap Take 1,000 mg by mouth 2 (tw 6.4 oz  05/22/18 : 274 lb 3.2 oz  04/23/18 : 279 lb    BP Readings from Last 3 Encounters:  02/05/19 : 120/80  12/11/18 : 120/84  12/03/18 : 118/82        Vital signs reviewed. Physical Exam   Constitutional: She is oriented to person, place, and time.  Sh every 1 month,  Hoses every 3 months,   CPAP mask and humidifier  chamber changed every 6 month  with the [employer] medical [employer] provider.          Advised if still with sleep apnea and not using CPAP has a  7 fold increase in risk of heart attack, monica

## 2019-02-08 ENCOUNTER — OFFICE VISIT (OUTPATIENT)
Dept: FAMILY MEDICINE CLINIC | Facility: CLINIC | Age: 59
End: 2019-02-08
Payer: COMMERCIAL

## 2019-02-08 ENCOUNTER — APPOINTMENT (OUTPATIENT)
Dept: LAB | Age: 59
End: 2019-02-08
Attending: FAMILY MEDICINE
Payer: COMMERCIAL

## 2019-02-08 VITALS
SYSTOLIC BLOOD PRESSURE: 142 MMHG | HEART RATE: 68 BPM | WEIGHT: 282 LBS | HEIGHT: 67 IN | TEMPERATURE: 97 F | RESPIRATION RATE: 16 BRPM | OXYGEN SATURATION: 97 % | BODY MASS INDEX: 44.26 KG/M2 | DIASTOLIC BLOOD PRESSURE: 76 MMHG

## 2019-02-08 DIAGNOSIS — G47.00 INSOMNIA, UNSPECIFIED TYPE: ICD-10-CM

## 2019-02-08 DIAGNOSIS — C43.62 MALIGNANT MELANOMA OF LEFT UPPER EXTREMITY INCLUDING SHOULDER (HCC): ICD-10-CM

## 2019-02-08 DIAGNOSIS — E20.9 HYPOPARATHYROIDISM, UNSPECIFIED HYPOPARATHYROIDISM TYPE (HCC): ICD-10-CM

## 2019-02-08 DIAGNOSIS — E78.5 HYPERLIPIDEMIA, UNSPECIFIED HYPERLIPIDEMIA TYPE: ICD-10-CM

## 2019-02-08 DIAGNOSIS — I10 BENIGN ESSENTIAL HYPERTENSION: Primary | ICD-10-CM

## 2019-02-08 DIAGNOSIS — E89.0 POSTOPERATIVE HYPOTHYROIDISM: ICD-10-CM

## 2019-02-08 DIAGNOSIS — E78.00 PURE HYPERCHOLESTEROLEMIA: ICD-10-CM

## 2019-02-08 DIAGNOSIS — E55.9 VITAMIN D DEFICIENCY: ICD-10-CM

## 2019-02-08 DIAGNOSIS — E53.8 B12 DEFICIENCY: ICD-10-CM

## 2019-02-08 DIAGNOSIS — R73.09 ELEVATED GLUCOSE: ICD-10-CM

## 2019-02-08 LAB
ALBUMIN SERPL-MCNC: 3.7 G/DL (ref 3.1–4.5)
ALBUMIN/GLOB SERPL: 1 {RATIO} (ref 1–2)
ALP LIVER SERPL-CCNC: 103 U/L (ref 46–118)
ALT SERPL-CCNC: 37 U/L (ref 14–54)
ANION GAP SERPL CALC-SCNC: 9 MMOL/L (ref 0–18)
AST SERPL-CCNC: 25 U/L (ref 15–41)
BASOPHILS # BLD AUTO: 0.04 X10(3) UL (ref 0–0.2)
BASOPHILS NFR BLD AUTO: 0.6 %
BILIRUB SERPL-MCNC: 0.4 MG/DL (ref 0.1–2)
BILIRUB UR QL STRIP.AUTO: NEGATIVE
BUN BLD-MCNC: 15 MG/DL (ref 8–20)
BUN/CREAT SERPL: 18.8 (ref 10–20)
CALCIUM BLD-MCNC: 8.3 MG/DL (ref 8.3–10.3)
CHLORIDE SERPL-SCNC: 105 MMOL/L (ref 101–111)
CHOLEST SMN-MCNC: 172 MG/DL (ref ?–200)
CO2 SERPL-SCNC: 28 MMOL/L (ref 22–32)
COLOR UR AUTO: YELLOW
CORTIS SERPL-MCNC: 9 UG/DL
CREAT BLD-MCNC: 0.8 MG/DL (ref 0.55–1.02)
DEPRECATED RDW RBC AUTO: 46.5 FL (ref 35.1–46.3)
EOSINOPHIL # BLD AUTO: 0.17 X10(3) UL (ref 0–0.7)
EOSINOPHIL NFR BLD AUTO: 2.4 %
ERYTHROCYTE [DISTWIDTH] IN BLOOD BY AUTOMATED COUNT: 14.3 % (ref 11–15)
EST. AVERAGE GLUCOSE BLD GHB EST-MCNC: 137 MG/DL (ref 68–126)
GLOBULIN PLAS-MCNC: 3.8 G/DL (ref 2.8–4.4)
GLUCOSE BLD-MCNC: 102 MG/DL (ref 70–99)
GLUCOSE UR STRIP.AUTO-MCNC: NEGATIVE MG/DL
HAV AB SERPL IA-ACNC: 383 PG/ML (ref 193–986)
HAV IGM SER QL: 2 MG/DL (ref 1.8–2.5)
HBA1C MFR BLD HPLC: 6.4 % (ref ?–5.7)
HCT VFR BLD AUTO: 40.8 % (ref 35–48)
HDLC SERPL-MCNC: 60 MG/DL (ref 40–59)
HGB BLD-MCNC: 13.7 G/DL (ref 12–16)
IMM GRANULOCYTES # BLD AUTO: 0.03 X10(3) UL (ref 0–1)
IMM GRANULOCYTES NFR BLD: 0.4 %
KETONES UR STRIP.AUTO-MCNC: NEGATIVE MG/DL
LDLC SERPL CALC-MCNC: 72 MG/DL (ref ?–100)
LYMPHOCYTES # BLD AUTO: 2.52 X10(3) UL (ref 1–4)
LYMPHOCYTES NFR BLD AUTO: 35 %
M PROTEIN MFR SERPL ELPH: 7.5 G/DL (ref 6.4–8.2)
MCH RBC QN AUTO: 30.2 PG (ref 26–34)
MCHC RBC AUTO-ENTMCNC: 33.6 G/DL (ref 31–37)
MCV RBC AUTO: 89.9 FL (ref 80–100)
MONOCYTES # BLD AUTO: 0.36 X10(3) UL (ref 0.1–1)
MONOCYTES NFR BLD AUTO: 5 %
NEUTROPHILS # BLD AUTO: 4.07 X10 (3) UL (ref 1.5–7.7)
NEUTROPHILS # BLD AUTO: 4.07 X10(3) UL (ref 1.5–7.7)
NEUTROPHILS NFR BLD AUTO: 56.6 %
NITRITE UR QL STRIP.AUTO: NEGATIVE
NONHDLC SERPL-MCNC: 112 MG/DL (ref ?–130)
OSMOLALITY SERPL CALC.SUM OF ELEC: 295 MOSM/KG (ref 275–295)
PH UR STRIP.AUTO: 7 [PH] (ref 4.5–8)
PLATELET # BLD AUTO: 281 10(3)UL (ref 150–450)
POTASSIUM SERPL-SCNC: 4.1 MMOL/L (ref 3.6–5.1)
PROT UR STRIP.AUTO-MCNC: NEGATIVE MG/DL
PTH-INTACT SERPL-MCNC: 16.7 PG/ML (ref 11.1–79.5)
RBC # BLD AUTO: 4.54 X10(6)UL (ref 3.8–5.3)
SODIUM SERPL-SCNC: 142 MMOL/L (ref 136–144)
SP GR UR STRIP.AUTO: 1.01 (ref 1–1.03)
TRIGL SERPL-MCNC: 200 MG/DL (ref 30–149)
TSI SER-ACNC: 0.71 MIU/ML (ref 0.35–5.5)
UROBILINOGEN UR STRIP.AUTO-MCNC: <2 MG/DL
VIT D+METAB SERPL-MCNC: 28.2 NG/ML (ref 30–100)
VLDLC SERPL CALC-MCNC: 40 MG/DL (ref 0–30)
WBC # BLD AUTO: 7.2 X10(3) UL (ref 4–11)

## 2019-02-08 PROCEDURE — 84403 ASSAY OF TOTAL TESTOSTERONE: CPT | Performed by: NURSE PRACTITIONER

## 2019-02-08 PROCEDURE — 82607 VITAMIN B-12: CPT | Performed by: FAMILY MEDICINE

## 2019-02-08 PROCEDURE — 80061 LIPID PANEL: CPT | Performed by: FAMILY MEDICINE

## 2019-02-08 PROCEDURE — 83970 ASSAY OF PARATHORMONE: CPT | Performed by: FAMILY MEDICINE

## 2019-02-08 PROCEDURE — 82533 TOTAL CORTISOL: CPT | Performed by: NURSE PRACTITIONER

## 2019-02-08 PROCEDURE — 99214 OFFICE O/P EST MOD 30 MIN: CPT | Performed by: FAMILY MEDICINE

## 2019-02-08 PROCEDURE — 36415 COLL VENOUS BLD VENIPUNCTURE: CPT | Performed by: FAMILY MEDICINE

## 2019-02-08 PROCEDURE — 83735 ASSAY OF MAGNESIUM: CPT | Performed by: FAMILY MEDICINE

## 2019-02-08 PROCEDURE — 81001 URINALYSIS AUTO W/SCOPE: CPT | Performed by: FAMILY MEDICINE

## 2019-02-08 PROCEDURE — 87086 URINE CULTURE/COLONY COUNT: CPT | Performed by: FAMILY MEDICINE

## 2019-02-08 PROCEDURE — 82306 VITAMIN D 25 HYDROXY: CPT | Performed by: FAMILY MEDICINE

## 2019-02-08 PROCEDURE — 84402 ASSAY OF FREE TESTOSTERONE: CPT | Performed by: NURSE PRACTITIONER

## 2019-02-08 PROCEDURE — 80050 GENERAL HEALTH PANEL: CPT | Performed by: FAMILY MEDICINE

## 2019-02-08 PROCEDURE — 83036 HEMOGLOBIN GLYCOSYLATED A1C: CPT | Performed by: FAMILY MEDICINE

## 2019-02-08 NOTE — PATIENT INSTRUCTIONS
rec labs today-- fasting       Pt to continue present medicatipns    Encourage exercise and healthy diet

## 2019-02-08 NOTE — PROGRESS NOTES
[address]  PROGRESS NOTE  Chief Complaint:   Patient presents with:   Follow - Up: 6 month f/u, patient is fasting for labs      HPI:   This is a 59year old female coming in for medical f.u    Pt generally feeling ok    Pt with loss of Activity      Alcohol use:  Yes        Alcohol/week: 0.0 oz        Comment: 1-2 drinks every 6 months-mixed      Drug use: No    Other Topics      Concerns:        Caffeine Concern: No        Exercise: No        Seat Belt: Yes        Special Diet: No 81 mg by mouth daily. Disp:  Rfl:    Rizatriptan Benzoate 10 MG Oral Tab Take 1 tablet (10 mg total) by mouth as needed. Disp: 30 tablet Rfl: 1   calciTRIOL 0.25 MCG Oral Cap Take 0.25 mcg by mouth daily.    Disp:  Rfl:    Multiple Vitamin (MULTIVITAMINS) O this encounter: 67\". Weight as of this encounter: 282 lb. Vital signs reviewed. Appears stated age, well groomed. Physical Exam:  GEN:  Patient is alert, awake and oriented, well developed, well nourished, no apparent distress.   HEENT:  Head:  Normoc LIPID PANEL; Future  - MAGNESIUM; Future  - URINALYSIS WITH CULTURE REFLEX; Future  - MAGNESIUM    3. Postoperative hypothyroidism      4. Hypoparathyroidism, unspecified hypoparathyroidism type ([facility] [address]    - VITAMIN D, 25-HYDROXY; Future  - PTH, INTACT;  Futu nonprocreative genetic counseling      Patient Instructions   rec labs today-- fasting       Pt to continue present medicatipns    Encourage exercise and healthy diet              Meds & Refills for this Visit:  Requested Prescriptions      No prescription

## 2019-02-14 LAB
SEX HORMONE BINDING GLOBULIN: 15 NMOL/L
TESTOSTERONE -MS, BIOAVAILAB: 4.5 NG/DL
TESTOSTERONE, -MS/MS: 7 NG/DL
TESTOSTERONE, FREE -MS/MS: 1.7 PG/ML

## 2019-02-15 ENCOUNTER — TELEPHONE (OUTPATIENT)
Dept: FAMILY MEDICINE CLINIC | Facility: CLINIC | Age: 59
End: 2019-02-15

## 2019-02-15 RX ORDER — CHOLECALCIFEROL (VITAMIN D3) 125 MCG
500 CAPSULE ORAL DAILY
COMMUNITY
End: 2020-02-01 | Stop reason: ALTCHOICE

## 2019-02-15 NOTE — TELEPHONE ENCOUNTER
----- Message from Felicia Quinn MD sent at 2/15/2019  8:32 AM CST -----  Laboratory results reviewed. Patient seen recently. Hemoglobin A1c stable at 6.4. Urine abnormal suspect contamination urine culture is negative.     Lipids are stable   t

## 2019-02-15 NOTE — TELEPHONE ENCOUNTER
Pt informed, states she did see her lab report in my chart account. Instructions reviewed with pt. Med list updated.     Future Appointments   Date Time Provider Lasha Tessy   3/26/2019  1:30 PM Joel Horton MD [facility] [employer]   8/6

## 2019-03-14 RX ORDER — RIZATRIPTAN BENZOATE 10 MG/1
TABLET ORAL
Qty: 30 TABLET | Refills: 0 | Status: SHIPPED | OUTPATIENT
Start: 2019-03-14 | End: 2019-08-25

## 2019-03-14 NOTE — TELEPHONE ENCOUNTER
RF request from [employer] for Rizatriptan 10 MG #30. Please advise. Future appt: Your appointments     Date & Time Appointment Department [facility])    Mar 26, 2019  1:30 PM CDT Physical - Established Patient with Mindy Gleason MD 25 [facility] ([facility])    Aug 06, 2019 11:00 AM CDT Sleep Follow Up with Jamey Cogan, [address], [facility] ([facility])        [address] [place] [address] [place]  [place] [id number] [phone]        Last Appointment:  2/8/2019  Cholesterol, Total (mg/dL)   Date Value   02/08/2019 172     HDL Cholesterol (mg/dL)   Date Value   02/08/2019 60 (H)     LDL Cholesterol (mg/dL)   Date Value   02/08/2019 72     Triglycerides (mg/dL)   Date Value   02/08/2019 200 (H)     Lab Results   Component Value Date     (H) 02/08/2019    A1C 6.4 (H) 02/08/2019     Lab Results   Component Value Date    TSH 0.708 02/08/2019     Last RF:  12/11/2018    No Follow-up on file.

## 2019-03-26 ENCOUNTER — OFFICE VISIT (OUTPATIENT)
Dept: FAMILY MEDICINE CLINIC | Facility: CLINIC | Age: 59
End: 2019-03-26
Payer: COMMERCIAL

## 2019-03-26 VITALS
OXYGEN SATURATION: 98 % | WEIGHT: 284 LBS | HEART RATE: 74 BPM | HEIGHT: 66.25 IN | SYSTOLIC BLOOD PRESSURE: 128 MMHG | DIASTOLIC BLOOD PRESSURE: 80 MMHG | BODY MASS INDEX: 45.64 KG/M2

## 2019-03-26 DIAGNOSIS — C73 MALIGNANT NEOPLASM OF THYROID GLAND (HCC): ICD-10-CM

## 2019-03-26 DIAGNOSIS — Z11.59 ENCOUNTER FOR HEPATITIS C SCREENING TEST FOR LOW RISK PATIENT: ICD-10-CM

## 2019-03-26 DIAGNOSIS — E78.00 PURE HYPERCHOLESTEROLEMIA: ICD-10-CM

## 2019-03-26 DIAGNOSIS — E20.9 HYPOPARATHYROIDISM, UNSPECIFIED HYPOPARATHYROIDISM TYPE (HCC): ICD-10-CM

## 2019-03-26 DIAGNOSIS — E53.8 B12 DEFICIENCY: ICD-10-CM

## 2019-03-26 DIAGNOSIS — R73.03 PREDIABETES: ICD-10-CM

## 2019-03-26 DIAGNOSIS — E66.01 CLASS 3 SEVERE OBESITY DUE TO EXCESS CALORIES WITH SERIOUS COMORBIDITY AND BODY MASS INDEX (BMI) OF 45.0 TO 49.9 IN ADULT (HCC): ICD-10-CM

## 2019-03-26 DIAGNOSIS — L40.8 OTHER PSORIASIS: ICD-10-CM

## 2019-03-26 DIAGNOSIS — E55.9 VITAMIN D DEFICIENCY: ICD-10-CM

## 2019-03-26 DIAGNOSIS — I10 BENIGN ESSENTIAL HYPERTENSION: ICD-10-CM

## 2019-03-26 DIAGNOSIS — E89.0 POSTOPERATIVE HYPOTHYROIDISM: ICD-10-CM

## 2019-03-26 DIAGNOSIS — Z00.00 ANNUAL PHYSICAL EXAM: Primary | ICD-10-CM

## 2019-03-26 PROCEDURE — 99396 PREV VISIT EST AGE 40-64: CPT | Performed by: FAMILY MEDICINE

## 2019-03-26 PROCEDURE — 99212 OFFICE O/P EST SF 10 MIN: CPT | Performed by: FAMILY MEDICINE

## 2019-03-26 RX ORDER — CLOBETASOL PROPIONATE 0.46 MG/ML
1 SOLUTION TOPICAL 2 TIMES DAILY
COMMUNITY
Start: 2018-12-04

## 2019-03-26 RX ORDER — CHOLECALCIFEROL (VITAMIN D3) 50 MCG
1 TABLET ORAL DAILY
COMMUNITY
Start: 2019-01-15

## 2019-03-26 NOTE — PROGRESS NOTES
CC: Annual Physical Exam    HPI:   Yehuda Frey is a 59year old female who presents for a complete physical exam.      Pt with new melanoma and sq cell  DR. Bruno Kim, to do melanoma   Dr. Norman Rueda to do Sq cell  Sees DR. Kenia Sheikh- Derm  Pt using appropriat ski Spec Gravity 1.012 1.001 - 1.030    Glucose Urine Negative Negative mg/dl    Bilirubin Urine Negative Negative    Ketones Urine Negative Negative mg/dL    Blood Urine Small (A) Negative    pH Urine 7.0 4.5 - 8.0    Protein Urine Negative Negative mg/dl 37.0 g/dL    RDW 14.3 11.0 - 15.0 %    RDW-SD 46.5 (H) 35.1 - 46.3 fL    Neutrophil Absolute Prelim 4.07 1.50 - 7.70 x10 (3) uL    Neutrophil Absolute 4.07 1.50 - 7.70 x10(3) uL    Lymphocyte Absolute 2.52 1.00 - 4.00 x10(3) uL    Monocyte Absolute 0.36 0. Take 1,000 mg by mouth 2 (two) times daily.    Disp:  Rfl:         Metrizamide             OTHER (SEE COMMENTS)    Comment:Other reaction(s): hives  Contrast  [Radiolog*        Comment:Other reaction(s): hives  Procaine                    Comment:Other reac sneezing, congestion, runny nose or sore throat. INTEGUMENTARY:  Denies rashes, itching, skin lesion, or excessive skin dryness.   CARDIOVASCULAR:  Denies chest pain, chest pressure, chest discomfort, palpitations, edema, dyspnea on exertion or at rest.  R turgor. HEART:  Regular rate and rhythm, no murmurs, rubs or gallops. LUNGS: Clear to auscultation bilterally, no rales/rhonchi/wheezing. BREAST: No skin changes, no palpable abnormality bilaterally  CHEST: No tenderness.   ABDOMEN:  Soft, nondistended, and weight loss. Consider metformin if ok with endocrine given thyroid hx    - HEMOGLOBIN A1C; Future      Order for mammogram and dexascan  Self Breast exam reviewed.   Health maintenance, will check: Orders Placed This Encounter      Hemoglobin A1C [E]

## 2019-03-26 NOTE — PATIENT INSTRUCTIONS
Mammogram- to be scheduled    rec fasting labs 6 months    Consider metformin-- check with endocrine first    Continue care for skin cancer    Encourage walking

## 2019-04-16 ENCOUNTER — HOSPITAL ENCOUNTER (OUTPATIENT)
Dept: MAMMOGRAPHY | Age: 59
Discharge: HOME | End: 2019-04-16
Attending: FAMILY MEDICINE
Payer: COMMERCIAL

## 2019-04-16 DIAGNOSIS — Z12.39 BREAST CANCER SCREENING: ICD-10-CM

## 2019-04-16 PROCEDURE — 77063 BREAST TOMOSYNTHESIS BI: CPT | Performed by: FAMILY MEDICINE

## 2019-04-16 PROCEDURE — 77067 SCR MAMMO BI INCL CAD: CPT | Performed by: FAMILY MEDICINE

## 2019-04-16 RX ORDER — POTASSIUM CHLORIDE 20 MEQ/1
TABLET, EXTENDED RELEASE ORAL
Qty: 90 TABLET | Refills: 1 | Status: SHIPPED | OUTPATIENT
Start: 2019-04-16 | End: 2019-10-13

## 2019-04-16 NOTE — TELEPHONE ENCOUNTER
Future appt: Your appointments     Date & Time Appointment Department [facility])    Apr 16, 2019 10:30 AM CDT [facility] DIGITAL SCREENING W/CAD with [facility] [address] Mammography Department located in [place] ([facility])    Do NOT use deodorant, talcum powder, lotions, or creams on your breasts or underarms. They leave a coating that may be picked up by the x-rays, thereby distorting the mammogram.    Wear a two piece outfit the day of the exam. This allows you to be more comfortable during the exam.    There are no eating or activity restrictions for this exam.      Apr 16, 2019 10:30 AM CDT [facility] DIGITAL SCREENING W/CAD with [address] [place] [address],[address] [place] [address] Dexa Department located in [place] ([facility])    Do NOT use deodorant, talcum powder, lotions, or creams on your breasts or underarms. They leave a coating that may be picked up by the x-rays, thereby distorting the mammogram.    Wear a two piece outfit the day of the exam. This allows you to be more comfortable during the exam.    There are no eating or activity restrictions for this exam.      Apr 16, 2019 10:30 AM CDT [facility] DIGITAL SCREENING W/CAD with [facility]  [address] Department located in [place] ([facility])    Do NOT use deodorant, talcum powder, lotions, or creams on your breasts or underarms.  They leave a coating that may be picked up by the x-rays, thereby distorting the mammogram.    Wear a two piece outfit the day of the exam. This allows you to be more comfortable during the exam.    There are no eating or activity restrictions for this exam.      Aug 06, 2019 11:00 AM CDT Sleep Follow Up with [facility], Jenean Cogan, [address], [place] ([place]) [facility] Dexa Department located in [address]  [address]  [phone] [facility] Mammography Department located in [address]  [address] [facility] [address]  [facility] [address] [facility] XRAY Department located in [address]  [address] [facility] [address], [address] [place] [address] [id number] [phone]        Last Appointment:  3/26/2019 [facility])    Potassium refilled 1/16/19 for #90 only  Cholesterol, Total (mg/dL)   Date Value   02/08/2019 172     HDL Cholesterol (mg/dL)   Date Value   02/08/2019 60 (H)     LDL Cholesterol (mg/dL)   Date Value   02/08/2019 72     Triglycerides (mg/dL)   Date Value   02/08/2019 200 (H)     Lab Results   Component Value Date     (H) 02/08/2019    A1C 6.4 (H) 02/08/2019     Lab Results   Component Value Date    TSH 0.708 02/08/2019       No follow-ups on file.

## 2019-05-03 RX ORDER — DESVENLAFAXINE 50 MG/1
50 TABLET, EXTENDED RELEASE ORAL
Qty: 90 TABLET | Refills: 2 | Status: SHIPPED | OUTPATIENT
Start: 2019-05-03 | End: 2020-01-28

## 2019-05-03 RX ORDER — METOPROLOL SUCCINATE 25 MG/1
25 TABLET, EXTENDED RELEASE ORAL
Qty: 90 TABLET | Refills: 2 | Status: SHIPPED | OUTPATIENT
Start: 2019-05-03 | End: 2019-09-05

## 2019-05-03 NOTE — TELEPHONE ENCOUNTER
Future appt:     Your appointments     Date & Time Appointment Department [facility])    Aug 06, 2019 11:00 AM CDT Sleep Follow Up with Oriana Trejo, [address], [place] ([place])            [address] [place] [address]

## 2019-05-11 RX ORDER — TRIAMTERENE AND HYDROCHLOROTHIAZIDE 37.5; 25 MG/1; MG/1
CAPSULE ORAL
Qty: 90 CAPSULE | Refills: 1 | Status: SHIPPED | OUTPATIENT
Start: 2019-05-11 | End: 2019-11-06

## 2019-05-11 NOTE — TELEPHONE ENCOUNTER
Future appt:     Your appointments     Date & Time Appointment Department [facility])    Aug 06, 2019 11:00 AM CDT Sleep Follow Up with Neil Lopez, [address], [place] ([place])            [address] [place] [address]

## 2019-06-03 RX ORDER — ATORVASTATIN CALCIUM 20 MG/1
TABLET, FILM COATED ORAL
Qty: 90 TABLET | Refills: 0 | Status: SHIPPED | OUTPATIENT
Start: 2019-06-03 | End: 2019-09-01

## 2019-06-03 NOTE — TELEPHONE ENCOUNTER
Future appt: Your appointments     Date & Time Appointment Department [facility])    Aug 06, 2019 11:00 AM CDT Sleep Follow Up with Benito Urena, [address], [place] ([facility])            [address] [place] [address] Julisa Toney [address] [id number] [phone]        Last Appointment:  3/26/2019  Cholesterol, Total (mg/dL)   Date Value   02/08/2019 172     HDL Cholesterol (mg/dL)   Date Value   02/08/2019 60 (H)     LDL Cholesterol (mg/dL)   Date Value   02/08/2019 72     Triglycerides (mg/dL)   Date Value   02/08/2019 200 (H)     Lab Results   Component Value Date     (H) 02/08/2019    A1C 6.4 (H) 02/08/2019     Lab Results   Component Value Date    TSH 0.708 02/08/2019       No follow-ups on file.     Future Appointments   Date Time Provider Lasha Still   8/6/2019 11:00 AM Alley Soraes APRN [facility] [place] [facility] [place]

## 2019-06-05 DIAGNOSIS — G47.33 OSA (OBSTRUCTIVE SLEEP APNEA): ICD-10-CM

## 2019-06-05 DIAGNOSIS — G47.61 PERIODIC LIMB MOVEMENT DISORDER: ICD-10-CM

## 2019-06-05 RX ORDER — ESZOPICLONE 3 MG/1
3 TABLET, FILM COATED ORAL NIGHTLY PRN
Qty: 90 TABLET | Refills: 0 | Status: SHIPPED
Start: 2019-06-05 | End: 2019-08-06

## 2019-06-05 NOTE — TELEPHONE ENCOUNTER
Future appt:     Your appointments     Date & Time Appointment Department [facility])    Aug 06, 2019 11:00 AM CDT Sleep Follow Up with Cj Silva, [address], [place] ([place])            [address] [place] [address]

## 2019-07-29 ENCOUNTER — TELEPHONE (OUTPATIENT)
Dept: FAMILY MEDICINE CLINIC | Facility: CLINIC | Age: 59
End: 2019-07-29

## 2019-07-29 NOTE — TELEPHONE ENCOUNTER
----- Message from Lexi Capellan sent at 7/27/2019  5:19 PM CDT -----  Regarding: Prescription Question  Contact: [phone]  Hi Dr Josefa Kinsey. I have had dental issues my whole life. I have done everything I can and now I have reached the end of the road.

## 2019-07-30 NOTE — TELEPHONE ENCOUNTER
I would need appointment to discuss preop medications/ treatment.      . Pt may want to contact Dr. Emilia Vizcarra if  he treats her migraines normally

## 2019-07-30 NOTE — TELEPHONE ENCOUNTER
FYI:  Pt contacted and stated that she has too much going on and will not make an appt just to get a prescription. Pt also stated that she will not make an appt with Dr. Kristi Oliver either because she has not gone there in over 3 years.

## 2019-08-06 ENCOUNTER — PATIENT MESSAGE (OUTPATIENT)
Dept: FAMILY MEDICINE CLINIC | Facility: CLINIC | Age: 59
End: 2019-08-06

## 2019-08-06 ENCOUNTER — OFFICE VISIT (OUTPATIENT)
Dept: FAMILY MEDICINE CLINIC | Facility: CLINIC | Age: 59
End: 2019-08-06
Payer: COMMERCIAL

## 2019-08-06 VITALS
BODY MASS INDEX: 45.48 KG/M2 | OXYGEN SATURATION: 97 % | DIASTOLIC BLOOD PRESSURE: 74 MMHG | SYSTOLIC BLOOD PRESSURE: 134 MMHG | TEMPERATURE: 98 F | HEART RATE: 78 BPM | RESPIRATION RATE: 18 BRPM | WEIGHT: 283 LBS | HEIGHT: 66.25 IN

## 2019-08-06 DIAGNOSIS — G47.33 OSA (OBSTRUCTIVE SLEEP APNEA): Primary | ICD-10-CM

## 2019-08-06 PROCEDURE — 99214 OFFICE O/P EST MOD 30 MIN: CPT | Performed by: NURSE PRACTITIONER

## 2019-08-06 RX ORDER — ZOLPIDEM TARTRATE 5 MG/1
5 TABLET ORAL NIGHTLY PRN
Qty: 30 TABLET | Refills: 0 | Status: SHIPPED
Start: 2019-08-06 | End: 2019-08-07

## 2019-08-06 NOTE — PATIENT INSTRUCTIONS
Continue sleep therapy. Follow-up in 2 months - sooner if needed. Advised if still with sleep apnea and not using CPAP has a  7 fold increase in risk of heart attack, stroke, abnormal heart rhythm  and death,  increased risk of driving accidents.    A

## 2019-08-07 RX ORDER — ZOLPIDEM TARTRATE 5 MG/1
5 TABLET ORAL NIGHTLY PRN
Qty: 30 TABLET | Refills: 0 | Status: SHIPPED
Start: 2019-08-07 | End: 2019-09-27

## 2019-08-07 RX ORDER — ZOLPIDEM TARTRATE 5 MG/1
5 TABLET ORAL NIGHTLY PRN
Qty: 30 TABLET | Refills: 0 | Status: SHIPPED
Start: 2019-08-07 | End: 2019-08-07

## 2019-08-07 NOTE — TELEPHONE ENCOUNTER
From: Obed Pace  To: PAULA Ornelas  Sent: 8/6/2019 8:22 PM CDT  Subject: Prescription Question    Jessee Donnelly,  I have been waiting to hear from [place]. I have not. I am just checking to see if the Ambien prescription was sent over?  Can you c

## 2019-08-20 NOTE — PROGRESS NOTES
[facility]        HPI:   This is a 62year old female coming in for Patient presents with:  Obstructive Sleep Apnea (IRENE): compliance f/u      HPI:     resent for sleep follow-up.  States that the Rosaura Schlatter is not worki ago   • OTHER SURGICAL HISTORY      fibroids      Social History:  Social History    Social History Narrative      Sister with brain lymphoma now living with her in new home    Social History    Tobacco Use      Smoking status: Former Smoker      Smokeless aspirin 81 MG Oral Tab Take 81 mg by mouth daily. Disp:  Rfl:    calciTRIOL 0.25 MCG Oral Cap Take 0.25 mcg by mouth daily. Disp:  Rfl:    Multiple Vitamin (MULTIVITAMINS) Oral Cap Take 1 capsule by mouth daily.    Disp:  Rfl:    omega-3 fatty acids 100 calculated from the following:    Height as of this encounter: 66.25\". Weight as of this encounter: 283 lb. Neck in inches:       Wt Readings from Last 6 Encounters:  08/06/19 : 283 lb  03/26/19 : 284 lb  02/08/19 : 282 lb  02/05/19 : 281 lb 12.8 oz target heart rate .      Advised patient to change filters,masks,hoses  and tubes and equiptment on a  regular schedule.   Filters and seals shall be changed every 1 month,  Hoses every 3 months,   CPAP mask and humidifier  chamber changed every 6 month  wi

## 2019-08-26 RX ORDER — RIZATRIPTAN BENZOATE 10 MG/1
10 TABLET ORAL DAILY PRN
Qty: 30 TABLET | Refills: 0 | Status: SHIPPED | OUTPATIENT
Start: 2019-08-26 | End: 2019-12-30

## 2019-08-26 NOTE — TELEPHONE ENCOUNTER
Future appt:     Your appointments     Date & Time Appointment Department [facility])    Oct 15, 2019  9:30 AM CDT Sleep Follow Up with Fei Garnica, [address], [place] ([place])            [address] [place] [address]

## 2019-09-03 RX ORDER — ATORVASTATIN CALCIUM 20 MG/1
TABLET, FILM COATED ORAL
Qty: 90 TABLET | Refills: 1 | Status: SHIPPED | OUTPATIENT
Start: 2019-09-03 | End: 2020-02-28

## 2019-09-03 NOTE — TELEPHONE ENCOUNTER
Future appt:     Your appointments     Date & Time Appointment Department [facility])    Oct 15, 2019  9:30 AM CDT Sleep Follow Up with Roxane Vanegas, [address], [place] ([place])            [facility]

## 2019-09-05 ENCOUNTER — OFFICE VISIT (OUTPATIENT)
Dept: FAMILY MEDICINE CLINIC | Facility: CLINIC | Age: 59
End: 2019-09-05
Payer: COMMERCIAL

## 2019-09-05 VITALS
OXYGEN SATURATION: 98 % | DIASTOLIC BLOOD PRESSURE: 80 MMHG | SYSTOLIC BLOOD PRESSURE: 138 MMHG | HEIGHT: 66.25 IN | RESPIRATION RATE: 18 BRPM | TEMPERATURE: 98 F | BODY MASS INDEX: 45.32 KG/M2 | HEART RATE: 67 BPM | WEIGHT: 282 LBS

## 2019-09-05 DIAGNOSIS — M19.90 ARTHRITIS: Primary | ICD-10-CM

## 2019-09-05 DIAGNOSIS — M54.50 ACUTE MIDLINE LOW BACK PAIN WITHOUT SCIATICA: ICD-10-CM

## 2019-09-05 PROCEDURE — 99214 OFFICE O/P EST MOD 30 MIN: CPT | Performed by: NURSE PRACTITIONER

## 2019-09-05 RX ORDER — BETAMETHASONE DIPROPIONATE 0.5 MG/G
OINTMENT TOPICAL
COMMUNITY
Start: 2019-08-26

## 2019-09-05 RX ORDER — METHYLPREDNISOLONE 4 MG/1
TABLET ORAL
Qty: 1 KIT | Refills: 0 | Status: SHIPPED | OUTPATIENT
Start: 2019-09-05 | End: 2019-09-13 | Stop reason: ALTCHOICE

## 2019-09-05 NOTE — PROGRESS NOTES
Shiva Weiss is a 62year old female.   Patient presents with:  Back Pain  Arthritis      HPI:   Complaints of back pain for the last 2 weeks- has been acting up - taking Mobic PRN -  Has been taking every day for 10 days   Has to lye on the floor - conchita DAILY Disp: 90 capsule Rfl: 1   Desvenlafaxine Succinate ER 50 MG Oral Tablet 24 Hr Take 1 tablet (50 mg total) by mouth once daily.  Disp: 90 tablet Rfl: 2   POTASSIUM CHLORIDE ER 20 MEQ Oral Tab CR TAKE 1 TABLET DAILY Disp: 90 tablet Rfl: 1   Cholecalcife racing, nervous  Adhesive Tape           RASH    REVIEW OF SYSTEMS:   GENERAL HEALTH: feels well otherwise  HEENT: denies complaints  SKIN: denies any unusual skin lesions or rashes  RESPIRATORY: denies shortness of breath with exertion, no cough  CARDIOVA

## 2019-09-05 NOTE — PATIENT INSTRUCTIONS
Rest, wear supportive shoes, heating pad,  Medrol dose pack with food     meloxicam one a day with food-  Tylenol if needed.

## 2019-09-09 ENCOUNTER — TELEPHONE (OUTPATIENT)
Dept: FAMILY MEDICINE CLINIC | Facility: CLINIC | Age: 59
End: 2019-09-09

## 2019-09-09 NOTE — TELEPHONE ENCOUNTER
Informed pt of the recommendations below. Pt will try Dr. Gisell Rawls which she is already a pt. Pt will call with any concerns or if sxs worsen. Pt expressed understanding and thanks.

## 2019-09-09 NOTE — TELEPHONE ENCOUNTER
Take mobic daily-  Add Tylenol arthritis,  Would she like to do physical therapy? Can give referral does she have a preference to where she would go?   Otherwise consider Chiropractor, Dr. Nelson Gayle [phone]

## 2019-09-09 NOTE — TELEPHONE ENCOUNTER
does not feel any better since last visit with [place] [facility]- wants to know what she needs to do next

## 2019-09-13 ENCOUNTER — HOSPITAL ENCOUNTER (OUTPATIENT)
Dept: GENERAL RADIOLOGY | Age: 59
Discharge: HOME | End: 2019-09-13
Attending: FAMILY MEDICINE
Payer: COMMERCIAL

## 2019-09-13 ENCOUNTER — OFFICE VISIT (OUTPATIENT)
Dept: FAMILY MEDICINE CLINIC | Facility: CLINIC | Age: 59
End: 2019-09-13
Payer: COMMERCIAL

## 2019-09-13 ENCOUNTER — TELEPHONE (OUTPATIENT)
Dept: FAMILY MEDICINE CLINIC | Facility: CLINIC | Age: 59
End: 2019-09-13

## 2019-09-13 VITALS
HEART RATE: 76 BPM | DIASTOLIC BLOOD PRESSURE: 68 MMHG | TEMPERATURE: 97 F | WEIGHT: 282.38 LBS | OXYGEN SATURATION: 97 % | SYSTOLIC BLOOD PRESSURE: 134 MMHG | RESPIRATION RATE: 18 BRPM | HEIGHT: 66.25 IN | BODY MASS INDEX: 45.38 KG/M2

## 2019-09-13 DIAGNOSIS — M54.50 ACUTE RIGHT-SIDED LOW BACK PAIN WITHOUT SCIATICA: Primary | ICD-10-CM

## 2019-09-13 DIAGNOSIS — M54.50 ACUTE RIGHT-SIDED LOW BACK PAIN WITHOUT SCIATICA: ICD-10-CM

## 2019-09-13 DIAGNOSIS — M51.36 DEGENERATIVE DISC DISEASE, LUMBAR: ICD-10-CM

## 2019-09-13 PROCEDURE — 72110 X-RAY EXAM L-2 SPINE 4/>VWS: CPT | Performed by: FAMILY MEDICINE

## 2019-09-13 PROCEDURE — 99214 OFFICE O/P EST MOD 30 MIN: CPT | Performed by: FAMILY MEDICINE

## 2019-09-13 RX ORDER — PREDNISONE 20 MG/1
20 TABLET ORAL 2 TIMES DAILY
Qty: 10 TABLET | Refills: 0 | Status: SHIPPED | OUTPATIENT
Start: 2019-09-13 | End: 2019-09-18

## 2019-09-13 NOTE — PROGRESS NOTES
[facility] [place]  PROGRESS NOTE  Chief Complaint:   Patient presents with:  Back Pain      HPI:   This is a 62year old female coming in for back pain. Patient seen on September 5 by nurse practitioner and given a Medrol Dosepak.     Pt with taty Clear    Spec Gravity 1.012 1.001 - 1.030    Glucose Urine Negative Negative mg/dl    Bilirubin Urine Negative Negative    Ketones Urine Negative Negative mg/dL    Blood Urine Small (A) Negative    pH Urine 7.0 4.5 - 8.0    Protein Urine Negative Negative 33.6 31.0 - 37.0 g/dL    RDW 14.3 11.0 - 15.0 %    RDW-SD 46.5 (H) 35.1 - 46.3 fL    Neutrophil Absolute Prelim 4.07 1.50 - 7.70 x10 (3) uL    Neutrophil Absolute 4.07 1.50 - 7.70 x10(3) uL    Lymphocyte Absolute 2.52 1.00 - 4.00 x10(3) uL    Monocyte Abso Sister with brain lymphoma now living with her in new home    Family History:  Family History   Problem Relation Age of Onset   • Diabetes Father    • Hypertension Father    • Cancer Mother    • Diabetes Mother    • Hypertension Mother    • Cancer Maternal daily. Disp:  Rfl:    aspirin 81 MG Oral Tab Take 81 mg by mouth daily. Disp:  Rfl:    calciTRIOL 0.25 MCG Oral Cap Take 0.25 mcg by mouth daily. Disp:  Rfl:    Multiple Vitamin (MULTIVITAMINS) Oral Cap Take 1 capsule by mouth daily.    Disp:  Rfl:    ome encounter: 282 lb 6.4 oz. Vital signs reviewed. Appears stated age, well groomed. Physical Exam:  GEN:  Patient is alert, awake and oriented, well developed, well nourished, no apparent distress.   SKIN: No rashes, no skin lesion, no bruising, good turgor right-sided low back pain without sciatica     Degenerative disc disease, lumbar      Patient Instructions   rec xray of spine.    MRI  Expected pending results of updated xray    rec Prednisone    Continue mobic and tylenol    discuse dental care with dent

## 2019-09-13 NOTE — TELEPHONE ENCOUNTER
----- Message from Alberto Mijares MD sent at 9/13/2019  3:34 PM CDT -----  Xray of back showing. :  Multilevel advanced degenerative disease in the spine, showing no significant change compared with April 2018.  Severe disc narrowing at most levels, mansi

## 2019-09-13 NOTE — PATIENT INSTRUCTIONS
rec xray of spine.    MRI  Expected pending results of updated xray    rec Prednisone    Continue mobic and tylenol    discuse dental care with dentist

## 2019-09-16 ENCOUNTER — TELEPHONE (OUTPATIENT)
Dept: FAMILY MEDICINE CLINIC | Facility: CLINIC | Age: 59
End: 2019-09-16

## 2019-09-16 NOTE — TELEPHONE ENCOUNTER
Pre-cert for MRI still pending  Pt was informed. [facility].

## 2019-09-16 NOTE — TELEPHONE ENCOUNTER
Patient received  authorization from [facility] for the MRI, authorization number [id number]. Patient called [facility] to schedule MRI but pt was told that the orders were not in yet. Patient would like the orders faxed to [facility], fax number [phone].  Patient wou

## 2019-09-16 NOTE — TELEPHONE ENCOUNTER
Pt informed, authorization number added to MRI and faxed to [facility]. Pt states she will call to get scheduled.

## 2019-09-18 ENCOUNTER — TELEPHONE (OUTPATIENT)
Dept: FAMILY MEDICINE CLINIC | Facility: CLINIC | Age: 59
End: 2019-09-18

## 2019-09-18 NOTE — TELEPHONE ENCOUNTER
----- Message from Nav Bloom sent at 9/18/2019  6:03 PM CDT -----  Regarding: Referral Request  Contact: [phone]  Good Morning Dr Ann-Marie Eubanks. I have finished the 2nd round of prednisone. No improvement. I would say worse.  I am scheduled for my MRI to

## 2019-09-18 NOTE — TELEPHONE ENCOUNTER
Pt was seen on 9/13- had MRI ordered at that time. Discussed with CR.-  Pt informed will need to await test results. Pt advised to go to ER if s/s are acute. Pt verbalize understanding.

## 2019-09-20 ENCOUNTER — TELEPHONE (OUTPATIENT)
Dept: FAMILY MEDICINE CLINIC | Facility: CLINIC | Age: 59
End: 2019-09-20

## 2019-09-20 DIAGNOSIS — M51.36 DEGENERATIVE DISC DISEASE, LUMBAR: Primary | ICD-10-CM

## 2019-09-20 RX ORDER — HYDROCODONE BITARTRATE AND ACETAMINOPHEN 5; 325 MG/1; MG/1
1 TABLET ORAL EVERY 6 HOURS PRN
Qty: 20 TABLET | Refills: 0 | Status: SHIPPED | OUTPATIENT
Start: 2019-09-20 | End: 2020-02-01 | Stop reason: ALTCHOICE

## 2019-09-20 NOTE — TELEPHONE ENCOUNTER
Pt had MRI of Lumbar Spine [facility] [place] [facility] [place]. Impression: \"Moderate degenerative spondylosis with disc space narrowing and endplate spurring F3-5 through L5-S1. Most severe at L3-4 level.   Multilevel disc bulging and foraminal narrowin

## 2019-09-20 NOTE — TELEPHONE ENCOUNTER
Pt informed. Pt has appt at [facility] on Monday. Referral w/ copy of MRI report faxed to [facility].

## 2019-09-20 NOTE — TELEPHONE ENCOUNTER
[id number] [address] Steele for limited RX norco. Referral to Dr. Santizo See entered.     Reviewed [place] Prescription Monitoring program.

## 2019-09-20 NOTE — TELEPHONE ENCOUNTER
Pt informed. Pt would like to go to [facility]. Pt will check with her insurance. Pt will call back re: referral.      Pt is would like pain reliever. Pt is taking Tylenol 8 hour arthritis and Meloxicam.  Pt asking for muscle relaxor and pain medication.   Pt

## 2019-09-27 ENCOUNTER — PATIENT MESSAGE (OUTPATIENT)
Dept: FAMILY MEDICINE CLINIC | Facility: CLINIC | Age: 59
End: 2019-09-27

## 2019-09-27 ENCOUNTER — TELEPHONE (OUTPATIENT)
Dept: FAMILY MEDICINE CLINIC | Facility: CLINIC | Age: 59
End: 2019-09-27

## 2019-09-28 NOTE — TELEPHONE ENCOUNTER
From: Jeremiah Beltran  To: PAULA Bella  Sent: 9/27/2019 9:47 PM CDT  Subject: Prescription Question    Hi Cony. I sent in a request for the generic ambien. You sent over to [address] a 30 day supply on August 6th. Well.  . . I a

## 2019-09-28 NOTE — TELEPHONE ENCOUNTER
Last refill 8/7/19  Future appt:     Your appointments     Date & Time Appointment Department [facility])    Oct 15, 2019  9:30 AM CDT Sleep Follow Up with Fei Garnica, [address], [place] ([place])

## 2019-09-29 RX ORDER — ZOLPIDEM TARTRATE 5 MG/1
5 TABLET ORAL NIGHTLY PRN
Qty: 30 TABLET | Refills: 0 | Status: SHIPPED
Start: 2019-09-29 | End: 2019-10-15

## 2019-10-04 NOTE — TELEPHONE ENCOUNTER
Received a letter from [place] stating authorization was required through [facility] for services on 8/12/19 on procedure codes [address], [address] [facility], [address], & . These are CPAP supplies.   Letter faxed to [facility] @ [phone] on 9/30/19 to complete the authorizati

## 2019-10-14 RX ORDER — POTASSIUM CHLORIDE 20 MEQ/1
TABLET, EXTENDED RELEASE ORAL
Qty: 90 TABLET | Refills: 4 | Status: SHIPPED | OUTPATIENT
Start: 2019-10-14 | End: 2021-01-06

## 2019-10-14 NOTE — TELEPHONE ENCOUNTER
Future appt:     Your appointments     Date & Time Appointment Department [facility])    Oct 15, 2019  9:30 AM CDT Sleep Follow Up with Ofe Iverson, [address], [place] ([place])            [address] [place] [address]

## 2019-10-15 ENCOUNTER — OFFICE VISIT (OUTPATIENT)
Dept: FAMILY MEDICINE CLINIC | Facility: CLINIC | Age: 59
End: 2019-10-15
Payer: COMMERCIAL

## 2019-10-15 VITALS
OXYGEN SATURATION: 98 % | DIASTOLIC BLOOD PRESSURE: 74 MMHG | RESPIRATION RATE: 18 BRPM | HEART RATE: 66 BPM | WEIGHT: 285 LBS | BODY MASS INDEX: 45.8 KG/M2 | TEMPERATURE: 97 F | SYSTOLIC BLOOD PRESSURE: 138 MMHG | HEIGHT: 66.25 IN

## 2019-10-15 DIAGNOSIS — F51.01 PRIMARY INSOMNIA: ICD-10-CM

## 2019-10-15 DIAGNOSIS — G47.33 OSA (OBSTRUCTIVE SLEEP APNEA): Primary | ICD-10-CM

## 2019-10-15 PROCEDURE — 99214 OFFICE O/P EST MOD 30 MIN: CPT | Performed by: NURSE PRACTITIONER

## 2019-10-15 RX ORDER — ESZOPICLONE 3 MG/1
3 TABLET, FILM COATED ORAL NIGHTLY
Qty: 90 TABLET | Refills: 1 | Status: SHIPPED
Start: 2019-10-15 | End: 2020-03-21

## 2019-10-15 NOTE — PROGRESS NOTES
[facility]  [facility]GRESS [facility]        HPI:   This is a 62year old female coming in for Patient presents with:  Dme/cpap Update: f/u from 8/6/19      HPI:     Present for follow-up on sleep therapy.  States that she lays in bed trying to 2/1/2011   • Hypothyroidism    • Sleep apnea      Past Surgical History:   Procedure Laterality Date   • CHOLECYSTECTOMY     • COLONOSCOPY     • D & C     • MACY LOCALIZATION WIRE 1 SITE LEFT (CPT=19281)      2 times, benign/ 20+ years ago   • OTHER SURGICA Rfl: 0  TRIAMTERENE-HCTZ 37.5-25 MG Oral Cap, TAKE 1 CAPSULE DAILY, Disp: 90 capsule, Rfl: 1  Desvenlafaxine Succinate ER 50 MG Oral Tablet 24 Hr, Take 1 tablet (50 mg total) by mouth once daily. , Disp: 90 tablet, Rfl: 2  Cholecalciferol (VITAMIN D) 2000 u without sciatica     Degenerative disc disease, lumbar      REVIEW OF SYSTEMS:   Review of Systems   Constitutional: Negative. HENT: Negative. Eyes: Negative. Respiratory: Negative. Cardiovascular: Negative. Musculoskeletal: Negative.     Ski and vitals reviewed. ASSESSMENT AND PLAN:   1. IRENE (obstructive sleep apnea)    2. Primary insomnia    Patient Instructions   Continue sleep therapy. Resume the Lunesta. Follow-up in 6 months - sooner if needed.      Advised if still with sleep apn complications, allergies, or worsening or changing symptoms. Parent is to call with any side effects or complications from the treatments as a result of today.        PAULA Roque  10/15/2019  9:50 AM

## 2019-10-15 NOTE — PATIENT INSTRUCTIONS
Continue sleep therapy. Resume the Lunesta. Follow-up in 6 months - sooner if needed.      Advised if still with sleep apnea and not using CPAP has a  7 fold increase in risk of heart attack, stroke, abnormal heart rhythm  and death,  increased risk of

## 2019-11-02 ENCOUNTER — PATIENT MESSAGE (OUTPATIENT)
Dept: FAMILY MEDICINE CLINIC | Facility: CLINIC | Age: 59
End: 2019-11-02

## 2019-11-05 ENCOUNTER — PATIENT MESSAGE (OUTPATIENT)
Dept: FAMILY MEDICINE CLINIC | Facility: CLINIC | Age: 59
End: 2019-11-05

## 2019-11-05 NOTE — TELEPHONE ENCOUNTER
From: [facility]  To: Laura Raya MD  Sent: 11/2/2019 5:00 PM CDT  Subject: Test Results Question    I am scheduled to visit the lab on Nov 8th. I recently saw Dr Roderic Primrose. On Oct 3rd he had a blood draw done.    This is a listing of the test

## 2019-11-07 RX ORDER — TRIAMTERENE AND HYDROCHLOROTHIAZIDE 37.5; 25 MG/1; MG/1
CAPSULE ORAL
Qty: 90 CAPSULE | Refills: 4 | Status: SHIPPED | OUTPATIENT
Start: 2019-11-07 | End: 2021-01-30

## 2019-11-07 NOTE — TELEPHONE ENCOUNTER
Future appt:     Your appointments     Date & Time Appointment Department [facility])    Apr 14, 2020 10:00 AM CDT Sleep Follow Up with Feng Mathews, [address], [place] ([place])            [facility]

## 2019-11-19 NOTE — TELEPHONE ENCOUNTER
Spoke with pt. Pt scheduled appt for labs one week after her oral procedure.     Future Appointments   Date Time Provider Lasha Still   12/11/2019  8:45 AM REF [place] REF [facility] Ref [facility]   4/14/2020 10:00 AM Yehuda Soares APRN [facility] [place] [facility]

## 2019-11-19 NOTE — TELEPHONE ENCOUNTER
From: Opal Hicks  Sent: 11/19/2019 12:58 PM CST  To: [facility] [place] Clinical Staff  Subject: RE: Other    Hi Dr Reyes Dire,  I wanted to touch base with you to let you know I have not forgot about the lab work I need.  I will be getting another epidural steroi

## 2019-12-11 ENCOUNTER — LABORATORY ENCOUNTER (OUTPATIENT)
Dept: LAB | Age: 59
End: 2019-12-11
Attending: FAMILY MEDICINE
Payer: COMMERCIAL

## 2019-12-11 DIAGNOSIS — E78.00 PURE HYPERCHOLESTEROLEMIA: ICD-10-CM

## 2019-12-11 DIAGNOSIS — E55.9 VITAMIN D DEFICIENCY: ICD-10-CM

## 2019-12-11 DIAGNOSIS — Z11.59 ENCOUNTER FOR HEPATITIS C SCREENING TEST FOR LOW RISK PATIENT: ICD-10-CM

## 2019-12-11 DIAGNOSIS — R73.03 PREDIABETES: ICD-10-CM

## 2019-12-11 PROCEDURE — 83036 HEMOGLOBIN GLYCOSYLATED A1C: CPT | Performed by: FAMILY MEDICINE

## 2019-12-11 PROCEDURE — 82306 VITAMIN D 25 HYDROXY: CPT | Performed by: FAMILY MEDICINE

## 2019-12-11 PROCEDURE — 85025 COMPLETE CBC W/AUTO DIFF WBC: CPT | Performed by: FAMILY MEDICINE

## 2019-12-11 PROCEDURE — 80061 LIPID PANEL: CPT | Performed by: FAMILY MEDICINE

## 2019-12-11 PROCEDURE — 81001 URINALYSIS AUTO W/SCOPE: CPT | Performed by: FAMILY MEDICINE

## 2019-12-11 PROCEDURE — 86803 HEPATITIS C AB TEST: CPT | Performed by: FAMILY MEDICINE

## 2019-12-11 PROCEDURE — 36415 COLL VENOUS BLD VENIPUNCTURE: CPT | Performed by: FAMILY MEDICINE

## 2019-12-11 PROCEDURE — 80053 COMPREHEN METABOLIC PANEL: CPT | Performed by: FAMILY MEDICINE

## 2019-12-11 PROCEDURE — 83735 ASSAY OF MAGNESIUM: CPT | Performed by: FAMILY MEDICINE

## 2019-12-28 NOTE — TELEPHONE ENCOUNTER
Future Appointments   Date Time Provider Lasha Still   4/14/2020 10:00 AM Brenden Soares APRN [facility] [place] [facility] [place]

## 2019-12-30 RX ORDER — RIZATRIPTAN BENZOATE 10 MG/1
TABLET ORAL
Qty: 30 TABLET | Refills: 9 | Status: SHIPPED | OUTPATIENT
Start: 2019-12-30 | End: 2021-02-06

## 2020-01-28 RX ORDER — DESVENLAFAXINE 50 MG/1
TABLET, EXTENDED RELEASE ORAL
Qty: 90 TABLET | Refills: 0 | Status: SHIPPED | OUTPATIENT
Start: 2020-01-28 | End: 2020-04-27

## 2020-01-28 RX ORDER — METOPROLOL SUCCINATE 25 MG/1
TABLET, EXTENDED RELEASE ORAL
Qty: 90 TABLET | Refills: 0 | Status: SHIPPED | OUTPATIENT
Start: 2020-01-28 | End: 2020-04-27

## 2020-01-28 NOTE — TELEPHONE ENCOUNTER
Future appt:     Your appointments     Date & Time Appointment Department [facility])    Apr 14, 2020 10:00 AM CDT Sleep Follow Up with Feng Mathews, [address], [place] ([place])            [employer]

## 2020-02-01 ENCOUNTER — OFFICE VISIT (OUTPATIENT)
Dept: FAMILY MEDICINE CLINIC | Facility: CLINIC | Age: 60
End: 2020-02-01
Payer: COMMERCIAL

## 2020-02-01 VITALS
HEART RATE: 83 BPM | SYSTOLIC BLOOD PRESSURE: 122 MMHG | TEMPERATURE: 98 F | OXYGEN SATURATION: 99 % | DIASTOLIC BLOOD PRESSURE: 70 MMHG | BODY MASS INDEX: 45 KG/M2 | WEIGHT: 284 LBS

## 2020-02-01 DIAGNOSIS — J00 ACUTE NASOPHARYNGITIS: Primary | ICD-10-CM

## 2020-02-01 PROCEDURE — 99213 OFFICE O/P EST LOW 20 MIN: CPT | Performed by: NURSE PRACTITIONER

## 2020-02-01 RX ORDER — OSELTAMIVIR PHOSPHATE 75 MG/1
75 CAPSULE ORAL DAILY
Qty: 10 CAPSULE | Refills: 0 | Status: SHIPPED | OUTPATIENT
Start: 2020-02-01 | End: 2020-02-11

## 2020-02-01 NOTE — PATIENT INSTRUCTIONS
Rest, increase fluids, salt water gargles ,neti pot (use distilled water) or saline nasal spray, Mucinex-DM, Aleve, follow up if symptoms persist or increase. Take tamiflu once a day for 10 days for prevention.

## 2020-02-01 NOTE — PROGRESS NOTES
CHIEF COMPLAINT:   No chief complaint on file.       HPI:   Verna March is a 62year old female who presents to clinic today with complaints of feeling ill   Niece was dx with influenza A -   Last Wed. (1/29)    Coughing, body aches, no fever - feels w thyroid cancer   • Degenerative disc disease, lumbar 9/13/2019   • Essential hypertension    • History of subtotal thyroidectomy 2/1/2011   • Hypothyroidism    • Sleep apnea       Social History:  Social History    Tobacco Use      Smoking status: Forme understanding and is in agreement with treatment plan. Follow up if symptoms do not improve or if symptoms worsen. Risk and benefits of medication discussed.     Meds & Refills for this Visit:  Requested Prescriptions     Signed Prescriptions Disp Refills

## 2020-02-28 RX ORDER — ATORVASTATIN CALCIUM 20 MG/1
TABLET, FILM COATED ORAL
Qty: 90 TABLET | Refills: 0 | Status: SHIPPED | OUTPATIENT
Start: 2020-02-28 | End: 2020-05-28

## 2020-02-28 NOTE — TELEPHONE ENCOUNTER
Future appt: Your appointments     Date & Time Appointment Department [facility])    Apr 07, 2020  9:00 AM CDT Adult Physical with Aggie Villalobos MD 25 [facility] ([facility])    PLEASE NOTE - Most insurances allow a Complete Physical once every 366 days. Please schedule accordingly. Please arrive 15 minutes prior to your scheduled appointment. Please also bring your Insurance card, Photo ID, and your medication bottles or a list of your current medication. If you no longer require this appointment, please contact your physician office to cancel. Apr 14, 2020 10:00 AM CDT Sleep Follow Up with Emily Alonzo, [address]  ([facility])            25 [facility], [address] [place] [address] [address] [place]  [address] [id number] [phone]        Last Appointment with provider:   9/13/2019  [facility] [place]:  2/1/2020    Last px:  3/26/19 -  Pt has upcoming appt in April     Atorvastatin refilled on 9/3/19 for 6 month supply      Cholesterol, Total (mg/dL)   Date Value   12/11/2019 197     HDL Cholesterol (mg/dL)   Date Value   12/11/2019 60 (H)     LDL Cholesterol (mg/dL)   Date Value   12/11/2019 96     Triglycerides (mg/dL)   Date Value   12/11/2019 203 (H)     Lab Results   Component Value Date     (H) 12/11/2019    A1C 6.4 (H) 12/11/2019     Lab Results   Component Value Date    TSH 0.708 02/08/2019       No follow-ups on file.

## 2020-03-21 RX ORDER — ESZOPICLONE 3 MG/1
3 TABLET, FILM COATED ORAL NIGHTLY
Qty: 90 TABLET | Refills: 1 | Status: SHIPPED | OUTPATIENT
Start: 2020-03-21 | End: 2020-04-20

## 2020-03-21 NOTE — TELEPHONE ENCOUNTER
Future appt:     Your appointments     Date & Time Appointment Department [facility])    Apr 03, 2020 11:00 AM CDT Sleep Follow Up with Tanya Bauer, [address], [place] ([facility])        Apr 07, 2020  9:0

## 2020-04-08 ENCOUNTER — TELEPHONE (OUTPATIENT)
Dept: FAMILY MEDICINE CLINIC | Facility: CLINIC | Age: 60
End: 2020-04-08

## 2020-04-08 NOTE — TELEPHONE ENCOUNTER
Charlienat Mariposa  verbally consents to a Virtual/Telephone Check-In service on 4/8/2020. Patient understands and accepts financial responsibility for any deductible, co-insurance and/or co-pays associated with this service.     [phone]   Phone consent

## 2020-04-23 ENCOUNTER — PATIENT MESSAGE (OUTPATIENT)
Dept: FAMILY MEDICINE CLINIC | Facility: CLINIC | Age: 60
End: 2020-04-23

## 2020-04-24 ENCOUNTER — OFFICE VISIT (OUTPATIENT)
Dept: FAMILY MEDICINE CLINIC | Facility: CLINIC | Age: 60
End: 2020-04-24
Payer: COMMERCIAL

## 2020-04-24 VITALS
WEIGHT: 280 LBS | DIASTOLIC BLOOD PRESSURE: 88 MMHG | OXYGEN SATURATION: 98 % | HEART RATE: 75 BPM | BODY MASS INDEX: 45 KG/M2 | TEMPERATURE: 97 F | HEIGHT: 66.25 IN | SYSTOLIC BLOOD PRESSURE: 142 MMHG | RESPIRATION RATE: 18 BRPM

## 2020-04-24 DIAGNOSIS — E53.8 B12 DEFICIENCY: ICD-10-CM

## 2020-04-24 DIAGNOSIS — I10 BENIGN ESSENTIAL HYPERTENSION: Primary | ICD-10-CM

## 2020-04-24 DIAGNOSIS — R00.2 PALPITATIONS: ICD-10-CM

## 2020-04-24 DIAGNOSIS — E89.0 POSTOPERATIVE HYPOTHYROIDISM: ICD-10-CM

## 2020-04-24 DIAGNOSIS — E55.9 VITAMIN D DEFICIENCY: ICD-10-CM

## 2020-04-24 DIAGNOSIS — E20.9 HYPOPARATHYROIDISM, UNSPECIFIED HYPOPARATHYROIDISM TYPE (HCC): ICD-10-CM

## 2020-04-24 DIAGNOSIS — E78.00 PURE HYPERCHOLESTEROLEMIA: ICD-10-CM

## 2020-04-24 PROCEDURE — 99214 OFFICE O/P EST MOD 30 MIN: CPT | Performed by: FAMILY MEDICINE

## 2020-04-24 PROCEDURE — 84439 ASSAY OF FREE THYROXINE: CPT | Performed by: FAMILY MEDICINE

## 2020-04-24 PROCEDURE — 82607 VITAMIN B-12: CPT | Performed by: FAMILY MEDICINE

## 2020-04-24 PROCEDURE — 93000 ELECTROCARDIOGRAM COMPLETE: CPT | Performed by: FAMILY MEDICINE

## 2020-04-24 PROCEDURE — 83970 ASSAY OF PARATHORMONE: CPT | Performed by: FAMILY MEDICINE

## 2020-04-24 PROCEDURE — 83735 ASSAY OF MAGNESIUM: CPT | Performed by: FAMILY MEDICINE

## 2020-04-24 PROCEDURE — 83036 HEMOGLOBIN GLYCOSYLATED A1C: CPT | Performed by: FAMILY MEDICINE

## 2020-04-24 PROCEDURE — 81001 URINALYSIS AUTO W/SCOPE: CPT | Performed by: FAMILY MEDICINE

## 2020-04-24 PROCEDURE — 80061 LIPID PANEL: CPT | Performed by: FAMILY MEDICINE

## 2020-04-24 PROCEDURE — 82043 UR ALBUMIN QUANTITATIVE: CPT | Performed by: FAMILY MEDICINE

## 2020-04-24 PROCEDURE — 82306 VITAMIN D 25 HYDROXY: CPT | Performed by: FAMILY MEDICINE

## 2020-04-24 PROCEDURE — 80050 GENERAL HEALTH PANEL: CPT | Performed by: FAMILY MEDICINE

## 2020-04-24 PROCEDURE — 82570 ASSAY OF URINE CREATININE: CPT | Performed by: FAMILY MEDICINE

## 2020-04-24 NOTE — TELEPHONE ENCOUNTER
See my chart message from pt below. Pt contacted. Pt states she has felt \"off\" for one week.   Pt BP today:  141/74 pulse 74 (taken before medication today)- pt taking both Metoprolol and Dyazide in AM.    Pt states she has frequent headaches, states

## 2020-04-24 NOTE — TELEPHONE ENCOUNTER
From: Darrin Franco  To: Denis Hayes MD  Sent: 4/23/2020 5:52 PM CDT  Subject: Non-Urgent Medical Question    I am writing about a prior medical where my medicine may need to be adjusted. I have my annual physical scheduled for June 23.  Originally

## 2020-04-24 NOTE — PROGRESS NOTES
Nury Velasco is a 56year old female.    Patient presents with:  Blood Pressure: recheck blood pressure       HPI:   Patient presents for  eval of elevated BP    Home /110 yesterday, pt with left neck sore  FRANKI 167/99  Some carmen today    Pt past due by mouth daily. • omega-3 fatty acids 1000 MG Oral Cap Take 1,000 mg by mouth 2 (two) times daily.           Past Medical History:   Diagnosis Date   • Arthritis 4/23/2018   • Cancer [facility])     thyroid cancer   • Degenerative disc disease, lumbar 9/13/ 25-HYDROXY   Result Value Ref Range    Vitamin D, 25OH, Total 34.3 30.0 - 100.0 ng/mL   HCV ANTIBODY   Result Value Ref Range    Hepatitis C Virus Nonreactive  Nonreactive    URINALYSIS WITH CULTURE REFLEX   Result Value Ref Range    Urine Color Yellow Yel rashes  RESPIRATORY: denies cough or shortness of breath  CARDIOVASCULAR: denies chest pain  GI: denies abdominal pain and denies heartburn  NEURO: denies headaches    EXAM:   /88   Pulse 75   Temp 97 °F (36.1 °C) (Tympanic)   Resp 18   Ht 66.25\" type ([facility]  Single gland post op. F.u pth level, calcium level  - PTH, INTACT; Future  - PTH, INTACT    5. Postoperative hypothyroidism  F.y labs, s/p removal and radiation  - TSH+FREE T4; Future  - TSH+FREE T4    6.  B12 deficiency  On suppliment, check la

## 2020-04-24 NOTE — PATIENT INSTRUCTIONS
rec increase ,metoprolol to 50 mg a day    Fasting labs today      Moist heat to left neck muscle, reviewed with patient stretches  Pt if not better    Copy of labs to Dr Lou Vázquez

## 2020-04-27 ENCOUNTER — VIRTUAL PHONE E/M (OUTPATIENT)
Dept: FAMILY MEDICINE CLINIC | Facility: CLINIC | Age: 60
End: 2020-04-27
Payer: COMMERCIAL

## 2020-04-27 ENCOUNTER — PATIENT MESSAGE (OUTPATIENT)
Dept: FAMILY MEDICINE CLINIC | Facility: CLINIC | Age: 60
End: 2020-04-27

## 2020-04-27 ENCOUNTER — TELEPHONE (OUTPATIENT)
Dept: FAMILY MEDICINE CLINIC | Facility: CLINIC | Age: 60
End: 2020-04-27

## 2020-04-27 VITALS — WEIGHT: 280 LBS | BODY MASS INDEX: 45 KG/M2

## 2020-04-27 DIAGNOSIS — F51.01 PRIMARY INSOMNIA: ICD-10-CM

## 2020-04-27 DIAGNOSIS — G47.33 OSA (OBSTRUCTIVE SLEEP APNEA): Primary | ICD-10-CM

## 2020-04-27 PROCEDURE — 99213 OFFICE O/P EST LOW 20 MIN: CPT | Performed by: NURSE PRACTITIONER

## 2020-04-27 RX ORDER — METOPROLOL SUCCINATE 25 MG/1
TABLET, EXTENDED RELEASE ORAL
Qty: 90 TABLET | Refills: 1 | Status: SHIPPED | OUTPATIENT
Start: 2020-04-27 | End: 2020-05-04

## 2020-04-27 RX ORDER — DESVENLAFAXINE SUCCINATE 50 MG/1
TABLET, EXTENDED RELEASE ORAL
Qty: 90 TABLET | Refills: 1 | Status: SHIPPED | OUTPATIENT
Start: 2020-04-27 | End: 2020-10-26

## 2020-04-27 NOTE — TELEPHONE ENCOUNTER
From: Lynette Moon  To: Yolande Calderon MD  Sent: 4/27/2020 9:12 AM CDT  Subject: Visit Follow-up Question    In reference to you having me double up my blood pressure medicine. Toprol? ?? I wanted to let you know that I feel much better.  I will start

## 2020-04-27 NOTE — TELEPHONE ENCOUNTER
Future appt: Your appointments     Date & Time Appointment Department [facility])    Apr 27, 2020 11:00 AM CDT Virtual Phone Visit with Doron Thibodeaux, [address] [facility], [place] ([place])    You have been scheduled for a Virtual Telephone visit with your provider. Please be available at your phone so that your physician can contact you, and be prepared with any questions or concerns. You may be billed a copay at a later time, depending upon your insurance. As always, your health is our priority. Jun 23, 2020  9:30 AM CDT Adult Physical with Lilia Becerra MD 25 [facility] ([place])    PLEASE NOTE - Most insurances allow a Complete Physical once every 366 days. Please schedule accordingly. Please arrive 15 minutes prior to your scheduled appointment. Please also bring your Insurance card, Photo ID, and your medication bottles or a list of your current medication. If you no longer require this appointment, please contact your physician office to cancel. [address] [place] [address]  [address] [address] [phone]        Last Appointment with provider:   4/24/2020  Last appointment at [facility] [place]:  4/24/2020      Metoprolol and Desvenlafaxine refilled on 1/28/20 for #90    Cholesterol, Total (mg/dL)   Date Value   04/24/2020 191     HDL Cholesterol (mg/dL)   Date Value   04/24/2020 56     LDL Cholesterol (mg/dL)   Date Value   04/24/2020 89     Triglycerides (mg/dL)   Date Value   04/24/2020 231 (H)     Lab Results   Component Value Date     (H) 04/24/2020    A1C 6.5 (H) 04/24/2020     Lab Results   Component Value Date    T4F 1.3 04/24/2020    TSH 0.630 04/24/2020       No follow-ups on file.

## 2020-04-27 NOTE — TELEPHONE ENCOUNTER
----- Message from Ebony Sauer MD sent at 4/27/2020  8:28 AM CDT -----  Laboratory results reviewed. Patient hemoglobin A1c 6.5Stable. Patient's chemistry profile shows fasting glucose of 109.   Patient electrolytes kidney and liver function normal

## 2020-04-27 NOTE — PROGRESS NOTES
[address] [place] [address]  SLEEP PROGRESS NOTE    Verna March  verbally consents to a Virtual/Telephone Check-In service on 4/27/2020.     Patient understands and accepts financial responsibility for any deductible, co-insurance and/or co-pays associ Sleep REM (%) - -   TOT Sleep TM (min) - -         Past Medical History:   Diagnosis Date   • Arthritis 4/23/2018   • Cancer [facility])     thyroid cancer   • Degenerative disc disease, lumbar 9/13/2019   • Essential hypertension    • History of subtotal thyr (APPOINTMENT DUE) 90 tablet 0   • RIZATRIPTAN BENZOATE 10 MG Oral Tab TAKE 1 TABLET DAILY AS NEEDED FOR MIGRAINE 30 tablet 9   • TRIAMTERENE-HCTZ 37.5-25 MG Oral Cap TAKE 1 CAPSULE DAILY 90 capsule 4   • POTASSIUM CHLORIDE ER 20 MEQ Oral Tab CR TAKE 1 TABL without sciatica     Degenerative disc disease, lumbar      REVIEW OF SYSTEMS:   Review of Systems   Constitutional: Negative. HENT: Negative. Eyes: Negative. Respiratory: Negative. Cardiovascular: Negative.     Musculoskeletal: Positive for art tubes and equiptment on a  regular schedule. Filters and seals shall be changed every 1 month,  Hoses every 3 months,   CPAP mask and humidifier  chamber changed every 6 month  with the Durable medical equipment provider.          Advised if still with sle

## 2020-04-27 NOTE — TELEPHONE ENCOUNTER
Pt informed. Lab report faxed to Dr. Zackary Granda. Pt states she was able to see her results on my chart.   Pt states she will follow up with endocrinologist.

## 2020-04-27 NOTE — PATIENT INSTRUCTIONS
Continue sleep therapy. Increased EPAP to 14. Follow-up in 2 months - sooner if needed.      Advised if still with sleep apnea and not using CPAP has a  7 fold increase in risk of heart attack, stroke, abnormal heart rhythm  and death,  increased risk o

## 2020-04-28 ENCOUNTER — PATIENT MESSAGE (OUTPATIENT)
Dept: FAMILY MEDICINE CLINIC | Facility: CLINIC | Age: 60
End: 2020-04-28

## 2020-04-28 NOTE — TELEPHONE ENCOUNTER
Message noted. Working remotely. Sent message to Shannan Vincent working in office this morning. Pt was contacted- Shantell Beltran was informed that we would refax labs as requested.

## 2020-04-28 NOTE — TELEPHONE ENCOUNTER
From: Nancy Hook  To: Sherif Early MD  Sent: 4/28/2020 9:03 AM CDT  Subject: Visit Follow-up Question    Rebecca I contacted Dr. Mary Jane Gibson to give him a heads up on the labs. They responded with the following email.     Good morning,     We h

## 2020-05-04 ENCOUNTER — PATIENT MESSAGE (OUTPATIENT)
Dept: FAMILY MEDICINE CLINIC | Facility: CLINIC | Age: 60
End: 2020-05-04

## 2020-05-04 RX ORDER — METOPROLOL SUCCINATE 50 MG/1
50 TABLET, EXTENDED RELEASE ORAL DAILY
Qty: 90 TABLET | Refills: 0 | Status: SHIPPED | OUTPATIENT
Start: 2020-05-04 | End: 2020-07-15

## 2020-05-04 NOTE — TELEPHONE ENCOUNTER
Pt contacted. Pt is taking BP medications at 9am.  Pt feels better since increasing the Metoprolol. Pt is taking Metoprolol 25mg-  2 tabs in AM along with    Dyazide in the AM  See BP log below. Routed to PCP for review.

## 2020-05-04 NOTE — TELEPHONE ENCOUNTER
From: Raoul Palafox  To: Gerald Wei MD  Sent: 5/4/2020 11:07 AM CDT  Subject: Visit Follow-up Question    My blood pressure log is attached.  I have enough blood pressure medicine for 6 weeks (doubling up the .25) If you want to up my dosage perman

## 2020-05-28 RX ORDER — ATORVASTATIN CALCIUM 20 MG/1
TABLET, FILM COATED ORAL
Qty: 90 TABLET | Refills: 0 | Status: SHIPPED | OUTPATIENT
Start: 2020-05-28 | End: 2020-08-26

## 2020-05-28 NOTE — TELEPHONE ENCOUNTER
Future appt: Your appointments     Date & Time Appointment Department [facility])    Jun 23, 2020  9:30 AM CDT Adult Physical with Rahel Mcgowan MD 25 [facility] ([facility])    PLEASE NOTE - Most insurances allow a Complete Physical once every 366 days. Please schedule accordingly. Please arrive 15 minutes prior to your scheduled appointment. Please also bring your Insurance card, Photo ID, and your medication bottles or a list of your current medication. If you no longer require this appointment, please contact your physician office to cancel. Jul 14, 2020 11:00 AM CDT Follow up Visit with Gilbert Villanueva, [address] [facility], [facility] ([facility])    Please arrive 15 minutes prior to your scheduled appointment. Please also bring your Insurance card, Photo ID, and your medication bottles or a list of your current medication. If your condition improves and this appointment is no longer needed, please contact your physician office to cancel.     Please verify with your primary care provider if your insurance requires a referral.          25 [facility] [id number] [phone]        Last Appointment with provider:   4/24/2020  Last appointment at [facility]:  4/24/2020  Last px: 3/26/2019    ATORVASTATIN 20 MG Oral Tab #90 with 0 refills, pt to take 1 tab nightly, last refilled on 2/28/2020    Cholesterol, Total (mg/dL)   Date Value   04/24/2020 191     HDL Cholesterol (mg/dL)   Date Value   04/24/2020 56     LDL Cholesterol (mg/dL)   Date Value   04/24/2020 89     Triglycerides (mg/dL)   Date Value   04/24/2020 231 (H)     Lab Results   Component Value Date     (H) 04/24/2020    A1C 6.5 (H) 04/24/2020     Lab Results   Component Value Date    T4F 1.3 04/24/2020    TSH 0.630 04/24/2020       No follow-ups on file.

## 2020-06-23 ENCOUNTER — OFFICE VISIT (OUTPATIENT)
Dept: FAMILY MEDICINE CLINIC | Facility: CLINIC | Age: 60
End: 2020-06-23
Payer: COMMERCIAL

## 2020-06-23 VITALS
SYSTOLIC BLOOD PRESSURE: 124 MMHG | BODY MASS INDEX: 43.64 KG/M2 | DIASTOLIC BLOOD PRESSURE: 78 MMHG | HEART RATE: 64 BPM | WEIGHT: 274.81 LBS | HEIGHT: 66.5 IN | RESPIRATION RATE: 16 BRPM | TEMPERATURE: 97 F

## 2020-06-23 DIAGNOSIS — Z23 NEED FOR PNEUMOCOCCAL VACCINATION: ICD-10-CM

## 2020-06-23 DIAGNOSIS — E89.0 POSTOPERATIVE HYPOTHYROIDISM: ICD-10-CM

## 2020-06-23 DIAGNOSIS — E55.9 VITAMIN D DEFICIENCY: ICD-10-CM

## 2020-06-23 DIAGNOSIS — G47.33 OSA (OBSTRUCTIVE SLEEP APNEA): ICD-10-CM

## 2020-06-23 DIAGNOSIS — M51.36 DEGENERATIVE DISC DISEASE, LUMBAR: ICD-10-CM

## 2020-06-23 DIAGNOSIS — I10 BENIGN ESSENTIAL HYPERTENSION: ICD-10-CM

## 2020-06-23 DIAGNOSIS — M54.50 ACUTE RIGHT-SIDED LOW BACK PAIN WITHOUT SCIATICA: ICD-10-CM

## 2020-06-23 DIAGNOSIS — Z00.00 ANNUAL PHYSICAL EXAM: Primary | ICD-10-CM

## 2020-06-23 DIAGNOSIS — C73 MALIGNANT NEOPLASM OF THYROID GLAND (HCC): ICD-10-CM

## 2020-06-23 DIAGNOSIS — E20.9 HYPOPARATHYROIDISM, UNSPECIFIED HYPOPARATHYROIDISM TYPE (HCC): ICD-10-CM

## 2020-06-23 DIAGNOSIS — E78.00 PURE HYPERCHOLESTEROLEMIA: ICD-10-CM

## 2020-06-23 DIAGNOSIS — Z98.84 LAP-BAND SURGERY STATUS: ICD-10-CM

## 2020-06-23 DIAGNOSIS — M19.90 ARTHRITIS: ICD-10-CM

## 2020-06-23 DIAGNOSIS — E53.8 B12 DEFICIENCY: ICD-10-CM

## 2020-06-23 PROCEDURE — 90732 PPSV23 VACC 2 YRS+ SUBQ/IM: CPT | Performed by: FAMILY MEDICINE

## 2020-06-23 PROCEDURE — 90471 IMMUNIZATION ADMIN: CPT | Performed by: FAMILY MEDICINE

## 2020-06-23 PROCEDURE — 99396 PREV VISIT EST AGE 40-64: CPT | Performed by: FAMILY MEDICINE

## 2020-06-23 RX ORDER — ESZOPICLONE 3 MG/1
3 TABLET, FILM COATED ORAL NIGHTLY
COMMUNITY
Start: 2020-06-18 | End: 2020-08-04

## 2020-06-23 NOTE — PROGRESS NOTES
CC: Annual Physical Exam    HPI:   Eliot Morgan is a 56year old female who presents for a complete physical exam.  Patient generally feeling well    Home -166/ 70-90 pulse 60-85    Patient with right knee pain no injury.   Patient feels his arthrit Cholesterol 89 <100 mg/dL    VLDL 46 (H) 0 - 30 mg/dL    Non HDL Chol 135 (H) <130 mg/dL    FASTING No    MAGNESIUM   Result Value Ref Range    Magnesium 1.9 1.6 - 2.6 mg/dL   MICROALB/CREAT RATIO, RANDOM URINE   Result Value Ref Range    Microalbumin, Michelle Bimler Lymphocyte Absolute 2.52 1.00 - 4.00 x10(3) uL    Monocyte Absolute 0.36 0.10 - 1.00 x10(3) uL    Eosinophil Absolute 0.15 0.00 - 0.70 x10(3) uL    Basophil Absolute 0.04 0.00 - 0.20 x10(3) uL    Immature Granulocyte Absolute 0.02 0.00 - 1.00 x10(3) uL Comment:Other reaction(s): shakey, heart racing, nervous  Adhesive Tape           RASH   Past Medical History:   Diagnosis Date   • Arthritis 4/23/2018   • Cancer [facility])     thyroid cancer   • Degenerative disc disease, lumbar 9/13/2019   • Essential hy sclerae. Ears, Nose, Throat:  Denies hearing loss, sneezing, congestion, runny nose or sore throat. INTEGUMENTARY:  Denies rashes, itching, skin lesion, or excessive skin dryness.   CARDIOVASCULAR:  Denies chest pain, chest pressure, chest discomfort, palp dentition. NECK: Supple, no JVD, no carotid bruit, no thyromegaly. SKIN: No rashes, no skin lesion, no bruising, good turgor. HEART:  Regular rate and rhythm, no murmurs, rubs or gallops.   LUNGS: Clear to auscultation bilterally, no rales/rhonchi/wheezi loss patient with no contraindication for nonsteroidal anti-inflammatories okay for trial of Aleve    11. Acute right-sided low back pain without sciatica  Improved    12. Degenerative disc disease, lumbar  Stable    13. LAP-BAND surgery status      14.  Ne

## 2020-06-23 NOTE — PATIENT INSTRUCTIONS
rec pneumonia vaccine    Consider shingle vaccine in a month    rec mammogram    Encourage continued diet and exercise    rec aleve 2 x  Day for knee pain-- with food    If not improving after 3-4 weeks  Then ortho eval    Continue medications    General l

## 2020-07-15 RX ORDER — METOPROLOL SUCCINATE 50 MG/1
TABLET, EXTENDED RELEASE ORAL
Qty: 90 TABLET | Refills: 3 | Status: SHIPPED | OUTPATIENT
Start: 2020-07-15 | End: 2021-07-12

## 2020-07-15 NOTE — TELEPHONE ENCOUNTER
Future appt:     Your appointments     Date & Time Appointment Department [facility])    Jul 23, 2020  1:00 PM CDT SCREENING MAMMOGRAM with FLORENTINO ELIZABETH [facility] [address] (JACKY Gibson)    Please arrive 15 minutes prior to your appointment or underarms.  They leave a coating that may be picked up by the x-rays, thereby distorting the mammogram.    Wear a two piece outfit the day of the exam. This allows you to be more comfortable during the exam.      Jul 23, 2020  1:45 PM CDT Injection with No follow-ups on file.

## 2020-07-23 ENCOUNTER — HOSPITAL ENCOUNTER (OUTPATIENT)
Dept: MAMMOGRAPHY | Age: 60
Discharge: HOME | End: 2020-07-23
Attending: FAMILY MEDICINE
Payer: COMMERCIAL

## 2020-07-23 ENCOUNTER — NURSE ONLY (OUTPATIENT)
Dept: FAMILY MEDICINE CLINIC | Facility: CLINIC | Age: 60
End: 2020-07-23
Payer: COMMERCIAL

## 2020-07-23 DIAGNOSIS — Z23 NEED FOR VACCINATION: ICD-10-CM

## 2020-07-23 DIAGNOSIS — Z12.31 BREAST CANCER SCREENING BY MAMMOGRAM: ICD-10-CM

## 2020-07-23 PROCEDURE — 90471 IMMUNIZATION ADMIN: CPT | Performed by: FAMILY MEDICINE

## 2020-07-23 PROCEDURE — 77067 SCR MAMMO BI INCL CAD: CPT | Performed by: FAMILY MEDICINE

## 2020-07-23 PROCEDURE — 77063 BREAST TOMOSYNTHESIS BI: CPT | Performed by: FAMILY MEDICINE

## 2020-07-23 PROCEDURE — 90750 HZV VACC RECOMBINANT IM: CPT | Performed by: FAMILY MEDICINE

## 2020-07-23 NOTE — PROGRESS NOTES
Patient here for first shingrix vaccine    Patient denies previous adverse vaccine reactions or allergies. Shingrix given left deltoid. Well tolerated by patient. Patient advised to schedule next shingrix vaccine in 2 months.    Shingrix information

## 2020-08-04 ENCOUNTER — OFFICE VISIT (OUTPATIENT)
Dept: FAMILY MEDICINE CLINIC | Facility: CLINIC | Age: 60
End: 2020-08-04
Payer: COMMERCIAL

## 2020-08-04 VITALS
DIASTOLIC BLOOD PRESSURE: 72 MMHG | OXYGEN SATURATION: 97 % | HEIGHT: 66.5 IN | TEMPERATURE: 97 F | RESPIRATION RATE: 16 BRPM | HEART RATE: 68 BPM | SYSTOLIC BLOOD PRESSURE: 118 MMHG | WEIGHT: 273.63 LBS | BODY MASS INDEX: 43.46 KG/M2

## 2020-08-04 DIAGNOSIS — G47.33 OSA (OBSTRUCTIVE SLEEP APNEA): Primary | ICD-10-CM

## 2020-08-04 PROCEDURE — 3074F SYST BP LT 130 MM HG: CPT | Performed by: NURSE PRACTITIONER

## 2020-08-04 PROCEDURE — 3078F DIAST BP <80 MM HG: CPT | Performed by: NURSE PRACTITIONER

## 2020-08-04 PROCEDURE — 3008F BODY MASS INDEX DOCD: CPT | Performed by: NURSE PRACTITIONER

## 2020-08-04 PROCEDURE — 99214 OFFICE O/P EST MOD 30 MIN: CPT | Performed by: NURSE PRACTITIONER

## 2020-08-04 RX ORDER — ESZOPICLONE 3 MG/1
3 TABLET, FILM COATED ORAL NIGHTLY
Qty: 90 TABLET | Refills: 0 | Status: SHIPPED | OUTPATIENT
Start: 2020-08-04 | End: 2020-10-15

## 2020-08-04 NOTE — PATIENT INSTRUCTIONS
Continue sleep therapy. Change mask. Follow-up in 2 months - sooner if needed.      Advised if still with sleep apnea and not using CPAP has a  7 fold increase in risk of heart attack, stroke, abnormal heart rhythm  and death,  increased risk of driving

## 2020-08-04 NOTE — PROGRESS NOTES
[facility]        HPI:   This is a 56year old female coming in for Patient presents with: Follow - Up: sleep      HPI:     Patient is present to review her sleep therapy.   States that she is still having a lot of t DME Provider Rosibel Napoles    Sleep Study Date - -   Sleep Study Type - -   Study Scored By? - -   Polysomnogram Dx - -   AHI  (/hr) - -   RDI (/hr) - -   PLM INDEX (/hr) - -   SS Facility - -   Sleep Study CM (CM H2O) - -   Sleep EFFC (%) - -   Sleep R mouth nightly.  90 tablet 0   • METOPROLOL SUCCINATE ER 50 MG Oral Tablet 24 Hr TAKE 1 TABLET DAILY 90 tablet 3   • ATORVASTATIN 20 MG Oral Tab TAKE 1 TABLET NIGHTLY (APPOINTMENT DUE) 90 tablet 0   • DESVENLAFAXINE SUCCINATE ER 50 MG Oral Tablet 24 Hr TAKE Vitamin D deficiency     Hypoparathyroidism ([facility]     Postoperative hypothyroidism     Arthritis     Melanoma ([facility]     Squamous cell cancer of skin of upper arm, right     Encounter for nonprocreative genetic counseling     Acute right-sided low back pain and intact distal pulses. Pulmonary/Chest: Effort normal and breath sounds normal. No respiratory distress. Musculoskeletal: Normal range of motion. Lymphadenopathy:     She has no cervical adenopathy.    Neurological: She is alert and oriented to per equiptment on a  regular schedule. Filters and seals shall be changed every 1 month,  Hoses every 3 months,   CPAP mask and humidifier  chamber changed every 6 month  with the Durable medical equipment provider.          Meds & Refills for this Visit:  Adrienne Garcia

## 2020-08-26 RX ORDER — ATORVASTATIN CALCIUM 20 MG/1
TABLET, FILM COATED ORAL
Qty: 90 TABLET | Refills: 3 | Status: SHIPPED | OUTPATIENT
Start: 2020-08-26 | End: 2021-07-29

## 2020-08-26 NOTE — TELEPHONE ENCOUNTER
Future appt:     Your appointments     Date & Time Appointment Department [facility])    Oct 22, 2020 10:30 AM CDT Sleep Follow Up with Lincoln Breen, [address], [place] ([place])            [facility]

## 2020-08-27 ENCOUNTER — PATIENT MESSAGE (OUTPATIENT)
Dept: FAMILY MEDICINE CLINIC | Facility: CLINIC | Age: 60
End: 2020-08-27

## 2020-08-27 DIAGNOSIS — M51.36 DEGENERATIVE DISC DISEASE, LUMBAR: Primary | ICD-10-CM

## 2020-08-27 RX ORDER — MELOXICAM 15 MG/1
TABLET ORAL
Qty: 90 TABLET | Refills: 0 | Status: SHIPPED | OUTPATIENT
Start: 2020-08-27

## 2020-08-27 RX ORDER — METHOCARBAMOL 750 MG/1
TABLET, FILM COATED ORAL
Qty: 180 TABLET | Refills: 0 | Status: SHIPPED | OUTPATIENT
Start: 2020-08-27

## 2020-08-27 NOTE — TELEPHONE ENCOUNTER
FIRST QUESTION: Patient requesting [facility]ills to [facility] 90 day supply:   1) meloxicam 15mg daily with food as needed. Not to be taken with NSAIDs. 2) Methocarbamol ( Robaxin) 750mg one tablet bid as needed.      Sandra Mendoza saw patient one year ago on

## 2020-08-27 NOTE — TELEPHONE ENCOUNTER
From: Loida Lopes  To: Elena Milian MD  Sent: 8/27/2020 9:02 AM CDT  Subject: Non-Urgent Minna Khalil and staff. I have 2 questions: #1 I am scheduled for my 2nd shingles shot on Oct 22.  I will also be getting the flu shot th

## 2020-08-27 NOTE — TELEPHONE ENCOUNTER
this medication should not be taken long-term–1 refill given–if symptoms not improving follow-up for an appointment or she may review these medications with Dr. Dyllan Valentin at her upcoming appointment.   As far as I know patient can have both the Shingrix and t

## 2020-10-15 ENCOUNTER — OFFICE VISIT (OUTPATIENT)
Dept: FAMILY MEDICINE CLINIC | Facility: CLINIC | Age: 60
End: 2020-10-15
Payer: COMMERCIAL

## 2020-10-15 VITALS
HEART RATE: 68 BPM | RESPIRATION RATE: 18 BRPM | OXYGEN SATURATION: 97 % | SYSTOLIC BLOOD PRESSURE: 128 MMHG | HEIGHT: 66.5 IN | BODY MASS INDEX: 43.99 KG/M2 | WEIGHT: 277 LBS | TEMPERATURE: 97 F | DIASTOLIC BLOOD PRESSURE: 70 MMHG

## 2020-10-15 DIAGNOSIS — G47.33 OSA (OBSTRUCTIVE SLEEP APNEA): Primary | ICD-10-CM

## 2020-10-15 PROCEDURE — 3078F DIAST BP <80 MM HG: CPT | Performed by: NURSE PRACTITIONER

## 2020-10-15 PROCEDURE — 99072 ADDL SUPL MATRL&STAF TM PHE: CPT | Performed by: NURSE PRACTITIONER

## 2020-10-15 PROCEDURE — 3074F SYST BP LT 130 MM HG: CPT | Performed by: NURSE PRACTITIONER

## 2020-10-15 PROCEDURE — 90471 IMMUNIZATION ADMIN: CPT | Performed by: NURSE PRACTITIONER

## 2020-10-15 PROCEDURE — 99214 OFFICE O/P EST MOD 30 MIN: CPT | Performed by: NURSE PRACTITIONER

## 2020-10-15 PROCEDURE — 3008F BODY MASS INDEX DOCD: CPT | Performed by: NURSE PRACTITIONER

## 2020-10-15 PROCEDURE — 90686 IIV4 VACC NO PRSV 0.5 ML IM: CPT | Performed by: NURSE PRACTITIONER

## 2020-10-15 RX ORDER — ZOLPIDEM TARTRATE 6.25 MG/1
6.25 TABLET, FILM COATED, EXTENDED RELEASE ORAL NIGHTLY PRN
Qty: 30 TABLET | Refills: 0 | Status: SHIPPED | OUTPATIENT
Start: 2020-10-15 | End: 2020-11-12

## 2020-10-15 NOTE — PROGRESS NOTES
[facility] [place]  SLEEP PROGRESS NOTE        HPI:   This is a 56year old female coming in for Patient presents with:  Obstructive Sleep Apnea (IRENE): follow up      HPI:     States that she has trouble falling asleep and staying asleep.  Has a benign/ 20+ years ago   • OTHER SURGICAL HISTORY      fibroids      Social History:  Social History    Social History Narrative      Sister with brain lymphoma now living with her in new home    Social History    Tobacco Use      Smoking status: Former Robin Cid 2000 units Oral Tab Take 1 tablet by mouth daily. • Clobetasol Propionate 0.05 % External Solution Apply 1 Application topically 2 (two) times daily. • Levothyroxine Sodium (SYNTHROID) 150 MCG Oral Tab Take 150 mcg by mouth daily.      • aspirin 81 EXAM:   /70 (BP Location: Right arm, Patient Position: Sitting, Cuff Size: large)   Pulse 68   Temp 97.1 °F (36.2 °C) (Temporal)   Resp 18   Ht 66.5\"   Wt 277 lb (125.6 kg)   SpO2 97%   BMI 44.04 kg/m²  Estimated body mass index is 44.04 kg/m² not using CPAP has a  7 fold increase in risk of heart attack, stroke, abnormal heart rhythm  and death,  increased risk of driving accidents. Advised to refrain from driving when sleepy.     COMPLIANCE is required by insurance for 4 hours a night most ni

## 2020-10-26 RX ORDER — DESVENLAFAXINE 50 MG/1
50 TABLET, EXTENDED RELEASE ORAL DAILY
Qty: 90 TABLET | Refills: 1 | Status: SHIPPED | OUTPATIENT
Start: 2020-10-26 | End: 2021-04-23

## 2020-10-26 NOTE — TELEPHONE ENCOUNTER
Future appt:     Your appointments     Date & Time Appointment Department [facility])    Nov 19, 2020  1:15 PM CST Sleep Follow Up with Greyson Stevenson, [facility] [facility], [place] ([place])            [address] [place] [address]

## 2020-11-06 NOTE — PATIENT INSTRUCTIONS
Flu vaccine today. Change pressure IPAP to 23, EPAP to 15, PS  2-8  Refilled Ambien  Continue sleep therapy. Follow-up in 1 month - sooner if needed.      Advised if still with sleep apnea and not using CPAP has a  7 fold increase in risk of heart attac

## 2020-11-12 ENCOUNTER — OFFICE VISIT (OUTPATIENT)
Dept: FAMILY MEDICINE CLINIC | Facility: CLINIC | Age: 60
End: 2020-11-12
Payer: COMMERCIAL

## 2020-11-12 VITALS
BODY MASS INDEX: 43.36 KG/M2 | OXYGEN SATURATION: 96 % | SYSTOLIC BLOOD PRESSURE: 124 MMHG | WEIGHT: 273 LBS | HEART RATE: 72 BPM | TEMPERATURE: 97 F | DIASTOLIC BLOOD PRESSURE: 80 MMHG | RESPIRATION RATE: 18 BRPM | HEIGHT: 66.5 IN

## 2020-11-12 DIAGNOSIS — G47.33 OSA (OBSTRUCTIVE SLEEP APNEA): Primary | ICD-10-CM

## 2020-11-12 PROCEDURE — 99214 OFFICE O/P EST MOD 30 MIN: CPT | Performed by: NURSE PRACTITIONER

## 2020-11-12 PROCEDURE — 3074F SYST BP LT 130 MM HG: CPT | Performed by: NURSE PRACTITIONER

## 2020-11-12 PROCEDURE — 99072 ADDL SUPL MATRL&STAF TM PHE: CPT | Performed by: NURSE PRACTITIONER

## 2020-11-12 PROCEDURE — 3008F BODY MASS INDEX DOCD: CPT | Performed by: NURSE PRACTITIONER

## 2020-11-12 PROCEDURE — 3079F DIAST BP 80-89 MM HG: CPT | Performed by: NURSE PRACTITIONER

## 2020-11-12 PROCEDURE — 90471 IMMUNIZATION ADMIN: CPT | Performed by: NURSE PRACTITIONER

## 2020-11-12 PROCEDURE — 90750 HZV VACC RECOMBINANT IM: CPT | Performed by: NURSE PRACTITIONER

## 2020-11-12 RX ORDER — ZOLPIDEM TARTRATE 12.5 MG/1
12.5 TABLET, FILM COATED, EXTENDED RELEASE ORAL NIGHTLY PRN
Qty: 90 TABLET | Refills: 1 | Status: SHIPPED | OUTPATIENT
Start: 2020-11-12 | End: 2021-05-11

## 2020-11-16 NOTE — PROGRESS NOTES
[facility]  [facility]        HPI:   This is a 56year old female coming in for Patient presents with: Follow - Up: Sleep follow up      HPI:     Patient is present for a follow-up on sleep therapy.  States that she is doing bet • OTHER SURGICAL HISTORY      fibroids      Social History:  Social History    Social History Narrative      Sister with brain lymphoma now living with her in new home    Social History    Tobacco Use      Smoking status: Former Smoker      Smokeless tob External Ointment APPLY 1 APPLICATION TOPICALLY TWICE A DAY     • Cholecalciferol (VITAMIN D) 2000 units Oral Tab Take 1 tablet by mouth daily. • Clobetasol Propionate 0.05 % External Solution Apply 1 Application topically 2 (two) times daily.      • Stephanie Chavez Pulse 72   Temp 96.9 °F (36.1 °C)   Resp 18   Ht 66.5\"   Wt 273 lb (123.8 kg)   SpO2 96%   BMI 43.40 kg/m²  Estimated body mass index is 43.4 kg/m² as calculated from the following:    Height as of this encounter: 66.5\".     Weight as of this encounter: 2 COMPLIANCE is required by insurance for 4 hours a night most nights of the week.   Recommend weight loss, and maintain and optimal BMI with Exercise 30 minutes most days of the week to target heart rate .      Advised patient to change filters,masks,hoses

## 2020-12-01 ENCOUNTER — PATIENT MESSAGE (OUTPATIENT)
Dept: FAMILY MEDICINE CLINIC | Facility: CLINIC | Age: 60
End: 2020-12-01

## 2020-12-01 NOTE — TELEPHONE ENCOUNTER
From: Edilma Cota  To: Fabby Valera MD  Sent: 12/1/2020 11:59 AM CST  Subject: Other    I have a message on my calendar today that I need to schedule follow-up lab test. Can you advise?

## 2020-12-01 NOTE — TELEPHONE ENCOUNTER
Pt was seen for px on 6/23/20- pt was advised then to follow up and have labs done again in 6 months-   Pt called, was advised to schedule appt's. Pt call was routed to appointment desk.

## 2020-12-21 ENCOUNTER — TELEPHONE (OUTPATIENT)
Dept: FAMILY MEDICINE CLINIC | Facility: CLINIC | Age: 60
End: 2020-12-21

## 2020-12-21 ENCOUNTER — TELEMEDICINE (OUTPATIENT)
Dept: FAMILY MEDICINE CLINIC | Facility: CLINIC | Age: 60
End: 2020-12-21
Payer: COMMERCIAL

## 2020-12-21 VITALS — HEIGHT: 66.5 IN | BODY MASS INDEX: 42.88 KG/M2 | TEMPERATURE: 98 F | WEIGHT: 270 LBS

## 2020-12-21 DIAGNOSIS — J06.9 VIRAL UPPER RESPIRATORY TRACT INFECTION: Primary | ICD-10-CM

## 2020-12-21 PROCEDURE — 99213 OFFICE O/P EST LOW 20 MIN: CPT | Performed by: NURSE PRACTITIONER

## 2020-12-21 PROCEDURE — 3008F BODY MASS INDEX DOCD: CPT | Performed by: NURSE PRACTITIONER

## 2020-12-21 NOTE — PATIENT INSTRUCTIONS
Rest, increase fluids, salt water gargles ,neti pot (use distilled water) or saline nasal spray, Mucinex-D (behind the counter), Advil cold and sinus (behind the counter), Alavert, Tylenol/Ibuprofen, follow up if symptoms persist or increase.       Vitamin

## 2020-12-21 NOTE — PROGRESS NOTES
CHIEF COMPLAINT:   Patient presents with:  Nasal Congestion  Sneezing  Cough  Fatigue      HPI:   Kaylee Zurita is a 56year old female who presents to clinic today via video visit  with complaints of not feeling well    Started sneezing on 12/14- then daily.     • calciTRIOL 0.25 MCG Oral Cap Take 0.25 mcg by mouth daily. • Multiple Vitamin (MULTIVITAMINS) Oral Cap Take 1 capsule by mouth daily. • omega-3 fatty acids 1000 MG Oral Cap Take 1,000 mg by mouth daily.           Past Medical Histor rashes,no suspicious lesions  ASSESSMENT AND PLAN:   Sean Marshall is a 56year old female who presents with ear problems symptoms are consistent with  ASSESSMENT:  Viral upper respiratory tract infection  (primary encounter diagnosis)    PLAN: Meds as li

## 2020-12-21 NOTE — TELEPHONE ENCOUNTER
Radha López  verbally consents to a Virtual/Telephone Check-In service on 12/21/2020. Patient understands and accepts financial responsibility for any deductible, co-insurance and/or co-pays associated with this service.       Future Appointments   D

## 2020-12-23 ENCOUNTER — TELEPHONE (OUTPATIENT)
Dept: FAMILY MEDICINE CLINIC | Facility: CLINIC | Age: 60
End: 2020-12-23

## 2020-12-23 RX ORDER — AMOXICILLIN 875 MG/1
875 TABLET, COATED ORAL 2 TIMES DAILY
Qty: 20 TABLET | Refills: 0 | Status: SHIPPED | OUTPATIENT
Start: 2020-12-23 | End: 2021-01-02

## 2020-12-23 NOTE — TELEPHONE ENCOUNTER
Please let patient know I sent a prescription for amoxicillin 875 mg 1 by mouth twice a day for 10 days to [facility] [place] can add this to her treatment for possible bacterial sinusitis.   Otherwise she can take Coricidin HBP, Mucinex, Halls throa

## 2020-12-23 NOTE — TELEPHONE ENCOUNTER
Patient informed of the below results and recommendations. Patient c/o continual sneezing, and runny/stuffy nose x2 weeks. States she just cannot get it \"under control. \"  Patient wondering what she take can take for her symptoms?   States she has used t

## 2021-01-06 RX ORDER — POTASSIUM CHLORIDE 20 MEQ/1
TABLET, EXTENDED RELEASE ORAL
Qty: 90 TABLET | Refills: 1 | Status: SHIPPED | OUTPATIENT
Start: 2021-01-06 | End: 2021-07-06

## 2021-01-06 NOTE — TELEPHONE ENCOUNTER
Future appt:     Your appointments     Date & Time Appointment Department [facility])    Jan 19, 2021  8:15 AM CST Laboratory Visit with REF [facility] (EDW Ref Lab [facility] ([place] [place]        Jan 26, 2021 10:30 AM CST Follow Up Visit with Lui Gutierrez
Universal Safety Interventions

## 2021-01-18 ENCOUNTER — TELEPHONE (OUTPATIENT)
Dept: FAMILY MEDICINE CLINIC | Facility: CLINIC | Age: 61
End: 2021-01-18

## 2021-01-18 NOTE — TELEPHONE ENCOUNTER
Pt has lab appt 1/19/21. Answered yes to runny nose on travel screening. Pt states it is due to allergies, no other symptoms.

## 2021-01-18 NOTE — TELEPHONE ENCOUNTER
Pt has lab appt tomorrow- covid screening reviewed. Pt states she has had a chronic runny nose since mid December. Pt had virtual appt with EL on 12/21. Pt states she had a negative covid test on 12/22.     Pt states she has clear drainage and some sneez

## 2021-01-19 ENCOUNTER — LABORATORY ENCOUNTER (OUTPATIENT)
Dept: LAB | Age: 61
End: 2021-01-19
Attending: FAMILY MEDICINE
Payer: COMMERCIAL

## 2021-01-19 DIAGNOSIS — Z00.00 ANNUAL PHYSICAL EXAM: ICD-10-CM

## 2021-01-19 DIAGNOSIS — E78.00 PURE HYPERCHOLESTEROLEMIA: ICD-10-CM

## 2021-01-19 DIAGNOSIS — I10 BENIGN ESSENTIAL HYPERTENSION: ICD-10-CM

## 2021-01-19 LAB
ALBUMIN SERPL-MCNC: 3.6 G/DL (ref 3.4–5)
ALBUMIN/GLOB SERPL: 1 {RATIO} (ref 1–2)
ALP LIVER SERPL-CCNC: 98 U/L
ALT SERPL-CCNC: 29 U/L
ANION GAP SERPL CALC-SCNC: 6 MMOL/L (ref 0–18)
AST SERPL-CCNC: 15 U/L (ref 15–37)
BILIRUB SERPL-MCNC: 0.4 MG/DL (ref 0.1–2)
BUN BLD-MCNC: 14 MG/DL (ref 7–18)
BUN/CREAT SERPL: 19.4 (ref 10–20)
CALCIUM BLD-MCNC: 8.8 MG/DL (ref 8.5–10.1)
CHLORIDE SERPL-SCNC: 106 MMOL/L (ref 98–112)
CHOLEST SMN-MCNC: 194 MG/DL (ref ?–200)
CO2 SERPL-SCNC: 28 MMOL/L (ref 21–32)
CREAT BLD-MCNC: 0.72 MG/DL
EST. AVERAGE GLUCOSE BLD GHB EST-MCNC: 137 MG/DL (ref 68–126)
GLOBULIN PLAS-MCNC: 3.7 G/DL (ref 2.8–4.4)
GLUCOSE BLD-MCNC: 117 MG/DL (ref 70–99)
HBA1C MFR BLD HPLC: 6.4 % (ref ?–5.7)
HDLC SERPL-MCNC: 60 MG/DL (ref 40–59)
LDLC SERPL CALC-MCNC: 95 MG/DL (ref ?–100)
M PROTEIN MFR SERPL ELPH: 7.3 G/DL (ref 6.4–8.2)
NONHDLC SERPL-MCNC: 134 MG/DL (ref ?–130)
OSMOLALITY SERPL CALC.SUM OF ELEC: 292 MOSM/KG (ref 275–295)
PATIENT FASTING Y/N/NP: YES
PATIENT FASTING Y/N/NP: YES
POTASSIUM SERPL-SCNC: 4 MMOL/L (ref 3.5–5.1)
SODIUM SERPL-SCNC: 140 MMOL/L (ref 136–145)
TRIGL SERPL-MCNC: 197 MG/DL (ref 30–149)
VLDLC SERPL CALC-MCNC: 39 MG/DL (ref 0–30)

## 2021-01-19 PROCEDURE — 36415 COLL VENOUS BLD VENIPUNCTURE: CPT | Performed by: FAMILY MEDICINE

## 2021-01-19 PROCEDURE — 80053 COMPREHEN METABOLIC PANEL: CPT | Performed by: FAMILY MEDICINE

## 2021-01-19 PROCEDURE — 80061 LIPID PANEL: CPT | Performed by: FAMILY MEDICINE

## 2021-01-19 PROCEDURE — 83036 HEMOGLOBIN GLYCOSYLATED A1C: CPT | Performed by: FAMILY MEDICINE

## 2021-01-26 ENCOUNTER — OFFICE VISIT (OUTPATIENT)
Dept: FAMILY MEDICINE CLINIC | Facility: CLINIC | Age: 61
End: 2021-01-26
Payer: COMMERCIAL

## 2021-01-26 VITALS
HEIGHT: 66.5 IN | DIASTOLIC BLOOD PRESSURE: 88 MMHG | TEMPERATURE: 97 F | RESPIRATION RATE: 16 BRPM | BODY MASS INDEX: 43.3 KG/M2 | HEART RATE: 75 BPM | WEIGHT: 272.63 LBS | SYSTOLIC BLOOD PRESSURE: 128 MMHG | OXYGEN SATURATION: 95 %

## 2021-01-26 DIAGNOSIS — E78.00 PURE HYPERCHOLESTEROLEMIA: Primary | ICD-10-CM

## 2021-01-26 DIAGNOSIS — E55.9 VITAMIN D DEFICIENCY: ICD-10-CM

## 2021-01-26 DIAGNOSIS — J01.41 ACUTE RECURRENT PANSINUSITIS: ICD-10-CM

## 2021-01-26 DIAGNOSIS — R73.03 PREDIABETES: ICD-10-CM

## 2021-01-26 DIAGNOSIS — I10 BENIGN ESSENTIAL HYPERTENSION: ICD-10-CM

## 2021-01-26 DIAGNOSIS — E89.0 POSTOPERATIVE HYPOTHYROIDISM: ICD-10-CM

## 2021-01-26 DIAGNOSIS — E53.8 B12 DEFICIENCY: ICD-10-CM

## 2021-01-26 PROCEDURE — 3079F DIAST BP 80-89 MM HG: CPT | Performed by: FAMILY MEDICINE

## 2021-01-26 PROCEDURE — 99214 OFFICE O/P EST MOD 30 MIN: CPT | Performed by: FAMILY MEDICINE

## 2021-01-26 PROCEDURE — 3008F BODY MASS INDEX DOCD: CPT | Performed by: FAMILY MEDICINE

## 2021-01-26 PROCEDURE — 3074F SYST BP LT 130 MM HG: CPT | Performed by: FAMILY MEDICINE

## 2021-01-26 RX ORDER — SULFAMETHOXAZOLE AND TRIMETHOPRIM 800; 160 MG/1; MG/1
1 TABLET ORAL 2 TIMES DAILY
Qty: 28 TABLET | Refills: 0 | Status: SHIPPED | OUTPATIENT
Start: 2021-01-26 | End: 2021-05-11

## 2021-01-26 NOTE — PATIENT INSTRUCTIONS
rec monitor Bp    Encourage exercise and diet - goal mobility and weight loss    rec antibiotic for sinusitis  rec claritin daily    Recheck fasting labs and physical after June 23

## 2021-01-26 NOTE — PROGRESS NOTES
[address]  PROGRESS NOTE  Chief Complaint:   Patient presents with:   Follow - Up: 6 month and lab review, Sinus problems started a year ago      HPI:   This is a 56year old female coming in for  General medical care      Pt with sinus mg/dL    FASTING Yes        Wt Readings from Last 6 Encounters:  01/26/21 : 272 lb 9.6 oz (123.7 kg)  12/21/20 : 270 lb (122.5 kg)  11/12/20 : 273 lb (123.8 kg)  10/15/20 : 277 lb (125.6 kg)  08/04/20 : 273 lb 9.6 oz (124.1 kg)  06/23/20 : 274 lb 12.8 oz ( hives  Procaine                    Comment:Other reaction(s): shakey, heart racing, nervous  Adhesive Tape           RASH  Current Meds:  Current Outpatient Medications   Medication Sig Dispense Refill   • Sulfamethoxazole-TMP -160 MG Oral Tab per ta fatigue. EENT:  Eyes:  Denies eye pain, visual loss, blurred vision, double vision or yellow sclerae. Ears, Nose, Throat:  Denies hearing loss, sneezing, congestion, runny nose or sore throat.   INTEGUMENTARY:  Denies rashes, itching, skin lesion, or exces patent, thin nasal discharge, no mucosal swelling on right especially the right compared to the left with significant irritation. Throat:  Pos  PNDs . Mouth:  No oral lesions or ulcerations, good dentition. NECK: Supple, no thyromegaly.   SKIN: No rashe hypothyroidism     Arthritis     Melanoma ([facility] [address]     Squamous cell cancer of skin of upper arm, right     Encounter for nonprocreative genetic counseling     Acute right-sided low back pain without sciatica     Degenerative disc disease, lumbar      Patient

## 2021-01-30 RX ORDER — TRIAMTERENE AND HYDROCHLOROTHIAZIDE 37.5; 25 MG/1; MG/1
CAPSULE ORAL
Qty: 90 CAPSULE | Refills: 3 | Status: SHIPPED | OUTPATIENT
Start: 2021-01-30 | End: 2022-01-25

## 2021-01-30 NOTE — TELEPHONE ENCOUNTER
Future appt:     Your appointments     Date & Time Appointment Department [facility])    May 11, 2021 10:30 AM CDT Sleep Follow Up with Miranda Mead, [address], [address], [facility] ([facility])        Jun 29, 2021 10:3

## 2021-02-06 ENCOUNTER — PATIENT MESSAGE (OUTPATIENT)
Dept: FAMILY MEDICINE CLINIC | Facility: CLINIC | Age: 61
End: 2021-02-06

## 2021-02-06 RX ORDER — RIZATRIPTAN BENZOATE 10 MG/1
TABLET ORAL
Qty: 30 TABLET | Refills: 0 | Status: SHIPPED | OUTPATIENT
Start: 2021-02-06 | End: 2021-07-06

## 2021-02-06 RX ORDER — AMOXICILLIN AND CLAVULANATE POTASSIUM 875; 125 MG/1; MG/1
1 TABLET, FILM COATED ORAL 2 TIMES DAILY
Qty: 20 TABLET | Refills: 0 | Status: SHIPPED | OUTPATIENT
Start: 2021-02-06 | End: 2021-02-16

## 2021-02-06 NOTE — TELEPHONE ENCOUNTER
Future appt:     Your appointments     Date & Time Appointment Department [facility])    May 11, 2021 10:30 AM CDT Sleep Follow Up with Sharon Bill, [address], [address] ([facility])        Jun 29, 2021 10:3

## 2021-02-06 NOTE — TELEPHONE ENCOUNTER
From: Sulma Chowdhury  To: Jose A Simmons MD  Sent: 2/6/2021 10:54 AM CST  Subject: Referral Request    Hi Dr Josefa Kinsey, I am 11 days in out of 14 on the antibiotic for sinus issues. With no relief. I also did Clairiton for 9 days and have moved to [place].

## 2021-02-08 ENCOUNTER — TELEPHONE (OUTPATIENT)
Dept: FAMILY MEDICINE CLINIC | Facility: CLINIC | Age: 61
End: 2021-02-08

## 2021-02-08 NOTE — TELEPHONE ENCOUNTER
----- Message from Brianda Jansen MD sent at 2/7/2021  8:18 AM CST -----  Please contact pt Monday- if not improving- ok for referral to ENT of choice

## 2021-02-08 NOTE — TELEPHONE ENCOUNTER
Final Anesthesia Post-op Assessment    Patient: Rosalio Pepe  Procedure(s) Performed: PROC  Anesthesia type: General    Vitals Value Taken Time   Temp 98.1 09/22/20 0917   Pulse 52 09/22/20 0838   Resp 19 09/22/20 0838   SpO2 97 % 09/22/20 0838   /71 09/22/20 0838         Patient Location: PACU Phase 1  Post-op Vital Signs:stable  Level of Consciousness: awake and alert  Respiratory Status: spontaneous ventilation  Cardiovascular stable  Hydration: euvolemic  Pain Management: adequately controlled  Handoff: Handoff to receiving nurse was performed and questions were answered  Vomiting: none   Nausea: None  Airway Patency:patent  Post-op Assessment: no complications and patient tolerated procedure well with no complications      No complications documented.    Please review MyChart message from patient in this encounter and also note below from Dr. De La Paz in this encounter.

## 2021-02-09 ENCOUNTER — PATIENT MESSAGE (OUTPATIENT)
Dept: FAMILY MEDICINE CLINIC | Facility: CLINIC | Age: 61
End: 2021-02-09

## 2021-02-09 DIAGNOSIS — J32.9 RECURRENT SINUSITIS: Primary | ICD-10-CM

## 2021-02-09 NOTE — TELEPHONE ENCOUNTER
From: Cindy Lincoln  To: Brook Corado MD  Sent: 2/9/2021 4:34 PM CST  Subject: Other    Hello,  I am scheduled for my 2nd COVID shot on Febuary 18, 2021. I just picked up the new antibiotics written by EARL Shearer last Saturday.  I was going to start

## 2021-02-10 NOTE — TELEPHONE ENCOUNTER
Pt informed. Pt will hold off on antx as suggested. Pt states she is having a very good day today    Pt will proceed with her OTC-[facility] generic nasal spray- pt is using daily as needed. Pt will plan for next vaccine.     Pt would like to stay local f

## 2021-02-10 NOTE — TELEPHONE ENCOUNTER
Reviewed patient chart entries. I am not sure another antibiotic is indicated with out re-examination. She can have appt this week with me if desired. I would recommend she use the nasal sprays for the week and get the next vaccine.  I would also suggest EN

## 2021-04-23 RX ORDER — DESVENLAFAXINE 50 MG/1
TABLET, EXTENDED RELEASE ORAL
Qty: 90 TABLET | Refills: 0 | Status: SHIPPED | OUTPATIENT
Start: 2021-04-23 | End: 2021-07-24

## 2021-04-23 NOTE — TELEPHONE ENCOUNTER
Future appt:     Your appointments     Date & Time Appointment Department [facility])    May 11, 2021 10:30 AM CDT Sleep Follow Up with Sharon Bill, [address], [address] ([facility])        Jun 29, 2021 10:3

## 2021-05-11 ENCOUNTER — OFFICE VISIT (OUTPATIENT)
Dept: FAMILY MEDICINE CLINIC | Facility: CLINIC | Age: 61
End: 2021-05-11
Payer: COMMERCIAL

## 2021-05-11 VITALS
WEIGHT: 279 LBS | TEMPERATURE: 98 F | OXYGEN SATURATION: 96 % | HEIGHT: 66.5 IN | BODY MASS INDEX: 44.31 KG/M2 | SYSTOLIC BLOOD PRESSURE: 140 MMHG | DIASTOLIC BLOOD PRESSURE: 78 MMHG | HEART RATE: 70 BPM | RESPIRATION RATE: 18 BRPM

## 2021-05-11 DIAGNOSIS — G47.33 OSA (OBSTRUCTIVE SLEEP APNEA): Primary | ICD-10-CM

## 2021-05-11 PROCEDURE — 99214 OFFICE O/P EST MOD 30 MIN: CPT | Performed by: NURSE PRACTITIONER

## 2021-05-11 PROCEDURE — 3078F DIAST BP <80 MM HG: CPT | Performed by: NURSE PRACTITIONER

## 2021-05-11 PROCEDURE — 3077F SYST BP >= 140 MM HG: CPT | Performed by: NURSE PRACTITIONER

## 2021-05-11 PROCEDURE — 3008F BODY MASS INDEX DOCD: CPT | Performed by: NURSE PRACTITIONER

## 2021-05-11 RX ORDER — IPRATROPIUM BROMIDE 42 UG/1
2 SPRAY, METERED NASAL 2 TIMES DAILY
COMMUNITY
Start: 2021-04-20

## 2021-05-11 RX ORDER — CALCIPOTRIENE 50 UG/G
OINTMENT TOPICAL
COMMUNITY
Start: 2021-04-15

## 2021-05-11 RX ORDER — LEVOCETIRIZINE DIHYDROCHLORIDE 5 MG/1
5 TABLET, FILM COATED ORAL DAILY
COMMUNITY
Start: 2021-04-20

## 2021-05-11 RX ORDER — ZOLPIDEM TARTRATE 12.5 MG/1
12.5 TABLET, FILM COATED, EXTENDED RELEASE ORAL NIGHTLY PRN
Qty: 90 TABLET | Refills: 1 | Status: SHIPPED | OUTPATIENT
Start: 2021-05-11 | End: 2021-11-11

## 2021-05-11 NOTE — PATIENT INSTRUCTIONS
Zopidem refill done. Continue sleep therapy. Follow-up in 6 months - sooner if needed.      Advised if still with sleep apnea and not using CPAP has a  7 fold increase in risk of heart attack, stroke, abnormal heart rhythm  and death,  increased risk of

## 2021-05-11 NOTE — PROGRESS NOTES
[facility]  SLEEP PROGRESS NOTE        HPI:   This is a 59year old female coming in for Patient presents with:  Obstructive Sleep Apnea (IRENE)      HPI:     Present for sleep therapy follow up. Under more stress, family issues.  States History of subtotal thyroidectomy 2/1/2011   • Hypothyroidism    • Sleep apnea      Past Surgical History:   Procedure Laterality Date   • CHOLECYSTECTOMY     • COLONOSCOPY     • D & C     • MACY LOCALIZATION WIRE 1 SITE LEFT (CPT=19281)      2 times, jeffrey KLOR-CON M20 20 MEQ Oral Tab CR TAKE 1 TABLET DAILY 90 tablet 1   • Meloxicam 15 MG Oral Tab One tablet daily with food as needed.  Do not take with NSAIDS 90 tablet 0   • methocarbamol 750 MG Oral Tab One tablet by mouth twice daily as needed 180 tablet 0 Encounter for nonprocreative genetic counseling     Acute right-sided low back pain without sciatica     Degenerative disc disease, lumbar      REVIEW OF SYSTEMS:   Review of Systems   Constitutional: Positive for fatigue. HENT: Negative.     Eyes: Saúl Arriola distress. Breath sounds: Normal breath sounds. Musculoskeletal:         General: Normal range of motion. Cervical back: Normal range of motion and neck supple. Lymphadenopathy:      Cervical: No cervical adenopathy.    Skin:     General: Skin equiptment on a  regular schedule. Filters and seals shall be changed every 1 month,  Hoses every 3 months,   CPAP mask and humidifier  chamber changed every 6 month  with the Durable medical equipment provider.      Continue with Prescription management o

## 2021-06-29 ENCOUNTER — OFFICE VISIT (OUTPATIENT)
Dept: FAMILY MEDICINE CLINIC | Facility: CLINIC | Age: 61
End: 2021-06-29
Payer: COMMERCIAL

## 2021-06-29 ENCOUNTER — LAB ENCOUNTER (OUTPATIENT)
Dept: LAB | Age: 61
End: 2021-06-29
Attending: FAMILY MEDICINE
Payer: COMMERCIAL

## 2021-06-29 VITALS
BODY MASS INDEX: 43.77 KG/M2 | TEMPERATURE: 97 F | SYSTOLIC BLOOD PRESSURE: 134 MMHG | OXYGEN SATURATION: 98 % | DIASTOLIC BLOOD PRESSURE: 80 MMHG | HEART RATE: 64 BPM | WEIGHT: 275.63 LBS | RESPIRATION RATE: 16 BRPM | HEIGHT: 66.5 IN

## 2021-06-29 DIAGNOSIS — E78.00 PURE HYPERCHOLESTEROLEMIA: ICD-10-CM

## 2021-06-29 DIAGNOSIS — R73.09 ABNORMAL GLUCOSE: ICD-10-CM

## 2021-06-29 DIAGNOSIS — M51.36 DEGENERATIVE DISC DISEASE, LUMBAR: ICD-10-CM

## 2021-06-29 DIAGNOSIS — Z98.84 LAP-BAND SURGERY STATUS: ICD-10-CM

## 2021-06-29 DIAGNOSIS — E55.9 VITAMIN D DEFICIENCY: ICD-10-CM

## 2021-06-29 DIAGNOSIS — E20.9 HYPOPARATHYROIDISM, UNSPECIFIED HYPOPARATHYROIDISM TYPE (HCC): ICD-10-CM

## 2021-06-29 DIAGNOSIS — I10 BENIGN ESSENTIAL HYPERTENSION: ICD-10-CM

## 2021-06-29 DIAGNOSIS — E89.0 POSTOPERATIVE HYPOTHYROIDISM: ICD-10-CM

## 2021-06-29 DIAGNOSIS — C44.622 SQUAMOUS CELL CANCER OF SKIN OF UPPER ARM, RIGHT: ICD-10-CM

## 2021-06-29 DIAGNOSIS — C73 MALIGNANT NEOPLASM OF THYROID GLAND (HCC): ICD-10-CM

## 2021-06-29 DIAGNOSIS — E53.8 B12 DEFICIENCY: ICD-10-CM

## 2021-06-29 DIAGNOSIS — Z00.00 ANNUAL PHYSICAL EXAM: Primary | ICD-10-CM

## 2021-06-29 DIAGNOSIS — C43.9 MALIGNANT MELANOMA, UNSPECIFIED SITE (HCC): ICD-10-CM

## 2021-06-29 DIAGNOSIS — M19.90 ARTHRITIS: ICD-10-CM

## 2021-06-29 PROBLEM — M54.50 ACUTE RIGHT-SIDED LOW BACK PAIN WITHOUT SCIATICA: Status: RESOLVED | Noted: 2019-09-13 | Resolved: 2021-06-29

## 2021-06-29 PROBLEM — Z71.83 ENCOUNTER FOR NONPROCREATIVE GENETIC COUNSELING: Status: RESOLVED | Noted: 2018-07-31 | Resolved: 2021-06-29

## 2021-06-29 LAB
ALBUMIN SERPL-MCNC: 3.6 G/DL (ref 3.4–5)
ALBUMIN/GLOB SERPL: 0.9 {RATIO} (ref 1–2)
ALP LIVER SERPL-CCNC: 105 U/L
ALT SERPL-CCNC: 32 U/L
ANION GAP SERPL CALC-SCNC: 6 MMOL/L (ref 0–18)
AST SERPL-CCNC: 21 U/L (ref 15–37)
BASOPHILS # BLD AUTO: 0.07 X10(3) UL (ref 0–0.2)
BASOPHILS NFR BLD AUTO: 0.9 %
BILIRUB SERPL-MCNC: 0.5 MG/DL (ref 0.1–2)
BILIRUB UR QL STRIP.AUTO: NEGATIVE
BUN BLD-MCNC: 12 MG/DL (ref 7–18)
BUN/CREAT SERPL: 16.2 (ref 10–20)
CALCIUM BLD-MCNC: 8.5 MG/DL (ref 8.5–10.1)
CHLORIDE SERPL-SCNC: 102 MMOL/L (ref 98–112)
CHOLEST SMN-MCNC: 176 MG/DL (ref ?–200)
CO2 SERPL-SCNC: 29 MMOL/L (ref 21–32)
COLOR UR AUTO: YELLOW
CREAT BLD-MCNC: 0.74 MG/DL
DEPRECATED RDW RBC AUTO: 45.7 FL (ref 35.1–46.3)
EOSINOPHIL # BLD AUTO: 0.18 X10(3) UL (ref 0–0.7)
EOSINOPHIL NFR BLD AUTO: 2.3 %
ERYTHROCYTE [DISTWIDTH] IN BLOOD BY AUTOMATED COUNT: 13.9 % (ref 11–15)
GLOBULIN PLAS-MCNC: 3.8 G/DL (ref 2.8–4.4)
GLUCOSE BLD-MCNC: 109 MG/DL (ref 70–99)
GLUCOSE UR STRIP.AUTO-MCNC: NEGATIVE MG/DL
HAV IGM SER QL: 2 MG/DL (ref 1.6–2.6)
HCT VFR BLD AUTO: 40.9 %
HDLC SERPL-MCNC: 60 MG/DL (ref 40–59)
HGB BLD-MCNC: 13.6 G/DL
IMM GRANULOCYTES # BLD AUTO: 0.03 X10(3) UL (ref 0–1)
IMM GRANULOCYTES NFR BLD: 0.4 %
KETONES UR STRIP.AUTO-MCNC: NEGATIVE MG/DL
LDLC SERPL CALC-MCNC: 86 MG/DL (ref ?–100)
LYMPHOCYTES # BLD AUTO: 2.53 X10(3) UL (ref 1–4)
LYMPHOCYTES NFR BLD AUTO: 32.4 %
M PROTEIN MFR SERPL ELPH: 7.4 G/DL (ref 6.4–8.2)
MCH RBC QN AUTO: 29.8 PG (ref 26–34)
MCHC RBC AUTO-ENTMCNC: 33.3 G/DL (ref 31–37)
MCV RBC AUTO: 89.7 FL
MONOCYTES # BLD AUTO: 0.41 X10(3) UL (ref 0.1–1)
MONOCYTES NFR BLD AUTO: 5.2 %
NEUTROPHILS # BLD AUTO: 4.59 X10 (3) UL (ref 1.5–7.7)
NEUTROPHILS # BLD AUTO: 4.59 X10(3) UL (ref 1.5–7.7)
NEUTROPHILS NFR BLD AUTO: 58.8 %
NITRITE UR QL STRIP.AUTO: NEGATIVE
NONHDLC SERPL-MCNC: 116 MG/DL (ref ?–130)
OSMOLALITY SERPL CALC.SUM OF ELEC: 284 MOSM/KG (ref 275–295)
PATIENT FASTING Y/N/NP: YES
PATIENT FASTING Y/N/NP: YES
PH UR STRIP.AUTO: 8 [PH] (ref 5–8)
PLATELET # BLD AUTO: 293 10(3)UL (ref 150–450)
POTASSIUM SERPL-SCNC: 4 MMOL/L (ref 3.5–5.1)
PROT UR STRIP.AUTO-MCNC: NEGATIVE MG/DL
RBC # BLD AUTO: 4.56 X10(6)UL
RBC UR QL AUTO: NEGATIVE
SODIUM SERPL-SCNC: 137 MMOL/L (ref 136–145)
SP GR UR STRIP.AUTO: 1.01 (ref 1–1.03)
TRIGL SERPL-MCNC: 181 MG/DL (ref 30–149)
UROBILINOGEN UR STRIP.AUTO-MCNC: <2 MG/DL
VIT B12 SERPL-MCNC: 380 PG/ML (ref 193–986)
VIT D+METAB SERPL-MCNC: 64.2 NG/ML (ref 30–100)
VLDLC SERPL CALC-MCNC: 29 MG/DL (ref 0–30)
WBC # BLD AUTO: 7.8 X10(3) UL (ref 4–11)

## 2021-06-29 PROCEDURE — 81001 URINALYSIS AUTO W/SCOPE: CPT | Performed by: FAMILY MEDICINE

## 2021-06-29 PROCEDURE — 3008F BODY MASS INDEX DOCD: CPT | Performed by: FAMILY MEDICINE

## 2021-06-29 PROCEDURE — 83735 ASSAY OF MAGNESIUM: CPT | Performed by: FAMILY MEDICINE

## 2021-06-29 PROCEDURE — 85025 COMPLETE CBC W/AUTO DIFF WBC: CPT | Performed by: FAMILY MEDICINE

## 2021-06-29 PROCEDURE — 99396 PREV VISIT EST AGE 40-64: CPT | Performed by: FAMILY MEDICINE

## 2021-06-29 PROCEDURE — 3075F SYST BP GE 130 - 139MM HG: CPT | Performed by: FAMILY MEDICINE

## 2021-06-29 PROCEDURE — 80061 LIPID PANEL: CPT | Performed by: FAMILY MEDICINE

## 2021-06-29 PROCEDURE — 80053 COMPREHEN METABOLIC PANEL: CPT | Performed by: FAMILY MEDICINE

## 2021-06-29 PROCEDURE — 82306 VITAMIN D 25 HYDROXY: CPT | Performed by: FAMILY MEDICINE

## 2021-06-29 PROCEDURE — 3079F DIAST BP 80-89 MM HG: CPT | Performed by: FAMILY MEDICINE

## 2021-06-29 PROCEDURE — 82607 VITAMIN B-12: CPT | Performed by: FAMILY MEDICINE

## 2021-06-29 NOTE — PATIENT INSTRUCTIONS
rec fasting labs    D/w pt diet and activity for weight loss      Encourage walking and exercise as able    Continue medications

## 2021-06-29 NOTE — PROGRESS NOTES
CC: Annual Physical Exam    HPI:   Patria Jose is a 59year old female who presents for a complete physical exam.    Pt saw Dr. Godfrey Lino- April--glucose - 6.2 hgba1c  Vit D and calcium lowside of normal-patient with a history of hypoparathyroidism monito Current Outpatient Medications   Medication Sig Dispense Refill   • Calcipotriene 0.005 % External Ointment Apply to affected area twice daily     • Ipratropium Bromide 0.06 % Nasal Solution 2 sprays by Nasal route 2 (two) times daily.      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 Ears, Nose, Throat:  Denies hearing loss, sneezing, congestion, runny nose or sore throat. INTEGUMENTARY:  Denies rashes, itching, skin lesion, or excessive skin dryness.   CARDIOVASCULAR:  Denies chest pain, chest pressure, chest discomfort, palp no skin lesion, no bruising, good turgor. HEART:  Regular rate and rhythm, no murmurs, rubs or gallops. LUNGS: Clear to auscultation bilaterally, no rales/rhonchi/wheezing.   BREAST: No tenderness, no masses, no lesion, no nipple discharge, no axillary ly melanoma, unspecified site [facility])  Asymptomatic follows with dermatology    13. Abnormal glucose  A1c 6.2 stable. Discussed with patient diet exercise for weight loss    14.  LAP-BAND surgery status  Asymptomatic    Patient Instructions   rec fasting labs

## 2021-06-30 ENCOUNTER — TELEPHONE (OUTPATIENT)
Dept: FAMILY MEDICINE CLINIC | Facility: CLINIC | Age: 61
End: 2021-06-30

## 2021-06-30 NOTE — TELEPHONE ENCOUNTER
----- Message from Carlito Hernandez MD sent at 6/29/2021  8:00 PM CDT -----  Lab results reviewed  Urine- negative  Chemsitry-normal, Magnesium normal  Lipids- elevation triglycerides, normal cholesterol  Glucose 109  Vir D- normal  Vit b12- low normal--

## 2021-07-06 RX ORDER — POTASSIUM CHLORIDE 20 MEQ/1
TABLET, EXTENDED RELEASE ORAL
Qty: 90 TABLET | Refills: 3 | Status: SHIPPED | OUTPATIENT
Start: 2021-07-06

## 2021-07-06 RX ORDER — RIZATRIPTAN BENZOATE 10 MG/1
TABLET ORAL
Qty: 30 TABLET | Refills: 0 | Status: SHIPPED | OUTPATIENT
Start: 2021-07-06 | End: 2021-08-16

## 2021-07-06 NOTE — TELEPHONE ENCOUNTER
Future appt:    Last Appointment with provider:   6/29/21  Last appointment at [facility]:  6/29/2021  Cholesterol, Total (mg/dL)   Date Value   06/29/2021 176     HDL Cholesterol (mg/dL)   Date Value   06/29/2021 60 (H)     LDL Cholesterol (mg/dL)   Jhon

## 2021-07-06 NOTE — TELEPHONE ENCOUNTER
Future appt:    Last Appointment with provider:   6/29/2021  Last appointment at [facility]:  6/29/2021- [address]- advised to return in 6 months    Rizatriptan refilled on 2/6/21 for #30, 0 refills    Cholesterol, Total (mg/dL)   Date Value   06/29/2021 176

## 2021-07-12 RX ORDER — METOPROLOL SUCCINATE 50 MG/1
TABLET, EXTENDED RELEASE ORAL
Qty: 90 TABLET | Refills: 1 | Status: SHIPPED | OUTPATIENT
Start: 2021-07-12 | End: 2022-01-06

## 2021-07-12 NOTE — TELEPHONE ENCOUNTER
Future appt:    Last Appointment with provider:   6/29/2021  Last appointment at [facility] [place]:  6/29/2021- [address]- pt was asked to return in 3-6 months      Metoprolol refilled on 7/15/20 for one year      Cholesterol, Total (mg/dL)   Date Value   06/29/2021 1

## 2021-07-24 NOTE — TELEPHONE ENCOUNTER
Future appt:    Last Appointment with provider:   6/29/2021  [place] appointment [place] [facility] [place]:  6/29/2021- [address]- pt was asked to return in 6 months      Desvenlafaxine refilled on 4/23/21 for #90, 0 refills  Cholesterol, Total (mg/dL)   Date Value   06/29/20

## 2021-07-25 RX ORDER — DESVENLAFAXINE 50 MG/1
TABLET, EXTENDED RELEASE ORAL
Qty: 90 TABLET | Refills: 1 | Status: SHIPPED | OUTPATIENT
Start: 2021-07-25 | End: 2022-01-18

## 2021-07-28 DIAGNOSIS — E78.00 PURE HYPERCHOLESTEROLEMIA: Primary | ICD-10-CM

## 2021-07-29 RX ORDER — ATORVASTATIN CALCIUM 20 MG/1
TABLET, FILM COATED ORAL
Qty: 90 TABLET | Refills: 3 | Status: SHIPPED | OUTPATIENT
Start: 2021-07-29

## 2021-07-29 NOTE — TELEPHONE ENCOUNTER
Please advise [facility]ill [facility] for atorvastatin?      Future appt:    Last Appointment with provider:   6/29/2021 for  Annual exam.  [place] appointment [place] [facility] [place]:  6/29/2021  Cholesterol, Total (mg/dL)   Date Value   06/29/2021 176

## 2021-08-16 RX ORDER — RIZATRIPTAN BENZOATE 10 MG/1
TABLET ORAL
Qty: 30 TABLET | Refills: 3 | Status: SHIPPED | OUTPATIENT
Start: 2021-08-16

## 2021-08-16 NOTE — TELEPHONE ENCOUNTER
Future appt:    Last Appointment with provider:   6/29/2021  Last appointment at [facility]:  6/29/2021  Last px: 6/29/2021-return in 6 months    RIZATRIPTAN BENZOATE 10 MG Oral Tab #30 with 0 refills, pt to take 1 tab po as needed for migraine, last ref

## 2021-09-01 ENCOUNTER — HOSPITAL ENCOUNTER (OUTPATIENT)
Dept: GENERAL RADIOLOGY | Age: 61
Discharge: HOME | End: 2021-09-01
Attending: NURSE PRACTITIONER
Payer: COMMERCIAL

## 2021-09-01 ENCOUNTER — OFFICE VISIT (OUTPATIENT)
Dept: FAMILY MEDICINE CLINIC | Facility: CLINIC | Age: 61
End: 2021-09-01
Payer: COMMERCIAL

## 2021-09-01 VITALS
HEART RATE: 81 BPM | TEMPERATURE: 96 F | OXYGEN SATURATION: 98 % | DIASTOLIC BLOOD PRESSURE: 70 MMHG | SYSTOLIC BLOOD PRESSURE: 130 MMHG | RESPIRATION RATE: 18 BRPM | WEIGHT: 278 LBS | HEIGHT: 66.5 IN | BODY MASS INDEX: 44.15 KG/M2

## 2021-09-01 DIAGNOSIS — M79.642 LEFT HAND PAIN: ICD-10-CM

## 2021-09-01 DIAGNOSIS — M79.642 LEFT HAND PAIN: Primary | ICD-10-CM

## 2021-09-01 PROCEDURE — 3078F DIAST BP <80 MM HG: CPT | Performed by: NURSE PRACTITIONER

## 2021-09-01 PROCEDURE — 99213 OFFICE O/P EST LOW 20 MIN: CPT | Performed by: NURSE PRACTITIONER

## 2021-09-01 PROCEDURE — 3075F SYST BP GE 130 - 139MM HG: CPT | Performed by: NURSE PRACTITIONER

## 2021-09-01 PROCEDURE — 3008F BODY MASS INDEX DOCD: CPT | Performed by: NURSE PRACTITIONER

## 2021-09-01 PROCEDURE — 73130 X-RAY EXAM OF HAND: CPT | Performed by: NURSE PRACTITIONER

## 2021-10-14 ENCOUNTER — PATIENT MESSAGE (OUTPATIENT)
Dept: FAMILY MEDICINE CLINIC | Facility: CLINIC | Age: 61
End: 2021-10-14

## 2021-10-16 ENCOUNTER — PATIENT MESSAGE (OUTPATIENT)
Dept: FAMILY MEDICINE CLINIC | Facility: CLINIC | Age: 61
End: 2021-10-16

## 2021-10-16 NOTE — TELEPHONE ENCOUNTER
From: Raoul Palafox  To: Gerald Wei MD  Sent: 10/16/2021 11:28 AM CDT  Subject: Diann Nash. I am coming in November 11th to see Dory Rosa, in Dr. Coleman Nunn office. I was wondering if I qualify for a covid booster shot.  Second q

## 2021-11-11 ENCOUNTER — OFFICE VISIT (OUTPATIENT)
Dept: FAMILY MEDICINE CLINIC | Facility: CLINIC | Age: 61
End: 2021-11-11
Payer: COMMERCIAL

## 2021-11-11 VITALS
HEIGHT: 66.5 IN | OXYGEN SATURATION: 96 % | HEART RATE: 75 BPM | TEMPERATURE: 98 F | DIASTOLIC BLOOD PRESSURE: 72 MMHG | WEIGHT: 274.63 LBS | BODY MASS INDEX: 43.62 KG/M2 | RESPIRATION RATE: 18 BRPM | SYSTOLIC BLOOD PRESSURE: 140 MMHG

## 2021-11-11 DIAGNOSIS — F51.01 PRIMARY INSOMNIA: ICD-10-CM

## 2021-11-11 DIAGNOSIS — G47.33 OSA (OBSTRUCTIVE SLEEP APNEA): Primary | ICD-10-CM

## 2021-11-11 PROCEDURE — 3077F SYST BP >= 140 MM HG: CPT | Performed by: NURSE PRACTITIONER

## 2021-11-11 PROCEDURE — 3078F DIAST BP <80 MM HG: CPT | Performed by: NURSE PRACTITIONER

## 2021-11-11 PROCEDURE — 3008F BODY MASS INDEX DOCD: CPT | Performed by: NURSE PRACTITIONER

## 2021-11-11 PROCEDURE — 99214 OFFICE O/P EST MOD 30 MIN: CPT | Performed by: NURSE PRACTITIONER

## 2021-11-11 RX ORDER — ZOLPIDEM TARTRATE 12.5 MG/1
12.5 TABLET, FILM COATED, EXTENDED RELEASE ORAL NIGHTLY PRN
Qty: 90 TABLET | Refills: 1 | Status: SHIPPED | OUTPATIENT
Start: 2021-11-11

## 2021-11-11 NOTE — PROGRESS NOTES
[facility]  SLEEP PROGRESS NOTE        HPI:   This is a 59year old female coming in for Patient presents with:  Obstructive Sleep Apnea (IRENE)      HPI:     Patient is present for sleep therapy follow up. States that sleep is going ok. cancer   • Degenerative disc disease, lumbar 9/13/2019   • Essential hypertension    • History of subtotal thyroidectomy 2/1/2011   • Hypothyroidism    • Pain in joint, shoulder region 1/12/2015   • Palpitations 6/7/2011   • Sleep apnea      Past Surgical MG Oral Tablet 24 Hr TAKE 1 TABLET DAILY 90 tablet 1   • KLOR-CON M20 20 MEQ Oral Tab CR TAKE 1 TABLET DAILY 90 tablet 3   • Calcipotriene 0.005 % External Ointment Apply to affected area twice daily     • Ipratropium Bromide 0.06 % Nasal Solution 2 sprays arm, right     Degenerative disc disease, lumbar      REVIEW OF SYSTEMS:   Review of Systems   Constitutional: Negative. HENT: Negative. Eyes: Negative. Respiratory: Negative. Cardiovascular: Negative. Musculoskeletal: Negative.     Skin: Neg Normal range of motion and neck supple. Lymphadenopathy:      Cervical: No cervical adenopathy. Skin:     General: Skin is warm and dry. Neurological:      Mental Status: She is alert and oriented to person, place, and time. Psychiatric:          Shruti John chamber changed every 6 month  with the Durable medical equipment provider. Continue with Prescription management of sleep apnea with PAP therapy. Independent interpretation of sleep download as defined above.          Meds & Refills for this Visit:

## 2021-12-28 ENCOUNTER — TELEPHONE (OUTPATIENT)
Dept: FAMILY MEDICINE CLINIC | Facility: CLINIC | Age: 61
End: 2021-12-28

## 2021-12-28 NOTE — TELEPHONE ENCOUNTER
Patient should wear mask in home when in same room with covid positive relative as long ast Edgar Randolph remains negative.

## 2021-12-28 NOTE — TELEPHONE ENCOUNTER
Pt found out that her niece that she lives with tested positive for covid yesterday. Pt has seasonal allergies- runny nose is not unusual.  Pt had headache today- but states that headache is starting to do away.   Pt states she had her covid booster 2 wks

## 2022-01-06 RX ORDER — METOPROLOL SUCCINATE 50 MG/1
TABLET, EXTENDED RELEASE ORAL
Qty: 90 TABLET | Refills: 3 | Status: SHIPPED | OUTPATIENT
Start: 2022-01-06

## 2022-01-06 NOTE — TELEPHONE ENCOUNTER
Future appt:     Your appointments     Date & Time Appointment Department [facility])    May 12, 2022 10:00 AM CDT Sleep Follow Up with Jj Yeung, [address], [place] ([place])            [address] [place] [address]

## 2022-01-18 NOTE — TELEPHONE ENCOUNTER
Future appt:     Your appointments     Date & Time Appointment Department [facility])    May 12, 2022 10:00 AM CDT Sleep Follow Up with Mio Bartlett, [address], [place] ([place])            Leonid Braun

## 2022-01-19 RX ORDER — DESVENLAFAXINE 50 MG/1
TABLET, EXTENDED RELEASE ORAL
Qty: 90 TABLET | Refills: 0 | Status: SHIPPED | OUTPATIENT
Start: 2022-01-19

## 2022-01-24 ENCOUNTER — PATIENT MESSAGE (OUTPATIENT)
Dept: FAMILY MEDICINE CLINIC | Facility: CLINIC | Age: 62
End: 2022-01-24

## 2022-01-24 DIAGNOSIS — E55.9 VITAMIN D DEFICIENCY: ICD-10-CM

## 2022-01-24 DIAGNOSIS — R73.09 ABNORMAL GLUCOSE: ICD-10-CM

## 2022-01-24 DIAGNOSIS — E53.8 B12 DEFICIENCY: ICD-10-CM

## 2022-01-24 DIAGNOSIS — E89.0 POSTOPERATIVE HYPOTHYROIDISM: Primary | ICD-10-CM

## 2022-01-25 RX ORDER — TRIAMTERENE AND HYDROCHLOROTHIAZIDE 37.5; 25 MG/1; MG/1
CAPSULE ORAL
Qty: 90 CAPSULE | Refills: 1 | Status: SHIPPED | OUTPATIENT
Start: 2022-01-25

## 2022-01-25 NOTE — TELEPHONE ENCOUNTER
From: Arben Bravo  To: Nadira Abreu MD  Sent: 1/24/2022 9:52 PM CST  Subject: Appointment Feb 25    I have an appt scheduled for Feb 25. Do I need to schedule any labs so they will be done at the time we meet?

## 2022-01-25 NOTE — TELEPHONE ENCOUNTER
Future appt:     Your appointments     Date & Time Appointment Department [facility])    Feb 25, 2022 11:30 AM CST Exam - [facility]        May 12, 2022 1

## 2022-01-25 NOTE — TELEPHONE ENCOUNTER
Pt had physical and health panel on 6/29/21. Pt is scheduled for med f/u on 2/25/22.       Future Appointments   Date Time Provider Lasha Tessy   2/25/2022 11:30 AM Mandi Mccarthy MD [facility] [place]   5/12/2022 10:00 AM Joss Soares

## 2022-02-13 ENCOUNTER — PATIENT MESSAGE (OUTPATIENT)
Dept: FAMILY MEDICINE CLINIC | Facility: CLINIC | Age: 62
End: 2022-02-13

## 2022-02-14 ENCOUNTER — TELEPHONE (OUTPATIENT)
Dept: FAMILY MEDICINE CLINIC | Facility: CLINIC | Age: 62
End: 2022-02-14

## 2022-02-14 ENCOUNTER — PATIENT MESSAGE (OUTPATIENT)
Dept: FAMILY MEDICINE CLINIC | Facility: CLINIC | Age: 62
End: 2022-02-14

## 2022-02-14 NOTE — TELEPHONE ENCOUNTER
From: Priyank Manning  Sent: 2/14/2022 2:28 PM CST  To: Allyssa Christensen Clinical Staff  Subject: Add on to Feb 25 appt    Thank you Pankaj Perera I got your phone call. I'll have my blood draw this Friday. And my appointment with Dr Davi Almanza will be in a week.  We'll talk then thank you very much

## 2022-02-14 NOTE — TELEPHONE ENCOUNTER
From: Choco Garcia  To: Arminda Schmidt MD  Sent: 2/13/2022 3:29 PM CST  Subject: Add on to Feb 25 appt    Gerda Ovalle. I have an appt the 25th for medicine review. I would like to add on a knee injury, It was injured 3 weeks ago and is still causing me discomfort. I would like you to take a look at it and advise next steps. I can walk on it with a limp. It is just not getting any better. Or do I need to get in sooner. I am thinking it has lasted this long. it will last another 10 days. TRANSFER - OUT REPORT:    Verbal report given to Zoraida(name) on Fabi Baker  being transferred to [facility](unit) for routine progression of care       Report consisted of patients Situation, Background, Assessment and   Recommendations(SBAR). Information from the following report(s) SBAR, Kardex, ED Summary, [facility] and Recent Results was reviewed with the receiving nurse. Lines:   Peripheral IV 08/12/18 Right Antecubital (Active)   Site Assessment Clean, dry, & intact 8/12/2018  9:44 AM   Phlebitis Assessment 0 8/12/2018  9:44 AM   Infiltration Assessment 0 8/12/2018  9:44 AM   Dressing Status Clean, dry, & intact 8/12/2018  9:44 AM   Dressing Type Transparent 8/12/2018  9:44 AM   Hub Color/Line Status Pink 8/12/2018  9:44 AM   Action Taken Blood drawn 8/12/2018  9:44 AM        Opportunity for questions and clarification was provided.       Patient transported with:   Monitor  Registered Nurse        Enmanuel Bledsoe RN

## 2022-02-18 ENCOUNTER — LABORATORY ENCOUNTER (OUTPATIENT)
Dept: LAB | Age: 62
End: 2022-02-18
Attending: FAMILY MEDICINE
Payer: COMMERCIAL

## 2022-02-18 DIAGNOSIS — R73.09 ABNORMAL GLUCOSE: ICD-10-CM

## 2022-02-18 LAB
ALBUMIN SERPL-MCNC: 3.5 G/DL (ref 3.4–5)
ALBUMIN/GLOB SERPL: 1.1 {RATIO} (ref 1–2)
ALP LIVER SERPL-CCNC: 107 U/L
ALT SERPL-CCNC: 28 U/L
ANION GAP SERPL CALC-SCNC: 6 MMOL/L (ref 0–18)
AST SERPL-CCNC: 14 U/L (ref 15–37)
BILIRUB SERPL-MCNC: 0.4 MG/DL (ref 0.1–2)
BUN BLD-MCNC: 13 MG/DL (ref 7–18)
CALCIUM BLD-MCNC: 8.7 MG/DL (ref 8.5–10.1)
CHLORIDE SERPL-SCNC: 104 MMOL/L (ref 98–112)
CHOLEST SERPL-MCNC: 163 MG/DL (ref ?–200)
CO2 SERPL-SCNC: 31 MMOL/L (ref 21–32)
CREAT BLD-MCNC: 0.84 MG/DL
EST. AVERAGE GLUCOSE BLD GHB EST-MCNC: 134 MG/DL (ref 68–126)
FASTING PATIENT LIPID ANSWER: YES
FASTING STATUS PATIENT QL REPORTED: YES
GLOBULIN PLAS-MCNC: 3.3 G/DL (ref 2.8–4.4)
GLUCOSE BLD-MCNC: 126 MG/DL (ref 70–99)
HBA1C MFR BLD: 6.3 % (ref ?–5.7)
HDLC SERPL-MCNC: 56 MG/DL (ref 40–59)
LDLC SERPL CALC-MCNC: 76 MG/DL (ref ?–100)
NONHDLC SERPL-MCNC: 107 MG/DL (ref ?–130)
OSMOLALITY SERPL CALC.SUM OF ELEC: 294 MOSM/KG (ref 275–295)
POTASSIUM SERPL-SCNC: 4.1 MMOL/L (ref 3.5–5.1)
PROT SERPL-MCNC: 6.8 G/DL (ref 6.4–8.2)
SODIUM SERPL-SCNC: 141 MMOL/L (ref 136–145)
TRIGL SERPL-MCNC: 186 MG/DL (ref 30–149)
VLDLC SERPL CALC-MCNC: 29 MG/DL (ref 0–30)

## 2022-02-18 PROCEDURE — 80053 COMPREHEN METABOLIC PANEL: CPT | Performed by: FAMILY MEDICINE

## 2022-02-18 PROCEDURE — 3044F HG A1C LEVEL LT 7.0%: CPT | Performed by: NURSE PRACTITIONER

## 2022-02-18 PROCEDURE — 83036 HEMOGLOBIN GLYCOSYLATED A1C: CPT | Performed by: FAMILY MEDICINE

## 2022-02-18 PROCEDURE — 80061 LIPID PANEL: CPT | Performed by: FAMILY MEDICINE

## 2022-02-21 ENCOUNTER — TELEPHONE (OUTPATIENT)
Dept: FAMILY MEDICINE CLINIC | Facility: CLINIC | Age: 62
End: 2022-02-21

## 2022-02-21 NOTE — TELEPHONE ENCOUNTER
----- Message from PAULA Thomas sent at 2/21/2022  7:45 AM CST -----  Please make sure patient receives my chart message as belowDrBonifacio Marii Francois is not in the office  - I reviewed your blood work - A1C is good at 6.3, CMP is normal, cholesterol overall looks pretty good,  you can review further at your upcoming appointment with Dr. Fahad Villareal. Cholesterol is good overall- triglycerides are high due to elevated blood sugar  You can discuss this further at your upcoming appointment. Thank Tona MITCHELL, FNP-BC

## 2022-02-21 NOTE — TELEPHONE ENCOUNTER
Pt called back, pt verbalized understanding. Pt states she will discuss further with CR at upcoming appt on Friday.     Future Appointments   Date Time Provider Lasha Tessy   2/25/2022 11:30 AM Rose Cao MD [facility]   5/12/2022 10:00 AM Manuelito Soares APRN [facility] [place] [facility] [place]

## 2022-02-25 ENCOUNTER — OFFICE VISIT (OUTPATIENT)
Dept: FAMILY MEDICINE CLINIC | Facility: CLINIC | Age: 62
End: 2022-02-25
Payer: COMMERCIAL

## 2022-02-25 VITALS
RESPIRATION RATE: 18 BRPM | SYSTOLIC BLOOD PRESSURE: 136 MMHG | BODY MASS INDEX: 43.26 KG/M2 | DIASTOLIC BLOOD PRESSURE: 78 MMHG | OXYGEN SATURATION: 97 % | HEART RATE: 74 BPM | WEIGHT: 272.38 LBS | TEMPERATURE: 97 F | HEIGHT: 66.5 IN

## 2022-02-25 DIAGNOSIS — E53.8 B12 DEFICIENCY: ICD-10-CM

## 2022-02-25 DIAGNOSIS — E11.9 CONTROLLED TYPE 2 DIABETES MELLITUS WITHOUT COMPLICATION, WITHOUT LONG-TERM CURRENT USE OF INSULIN (HCC): ICD-10-CM

## 2022-02-25 DIAGNOSIS — Z83.2 FAMILY HISTORY OF FACTOR V LEIDEN MUTATION: ICD-10-CM

## 2022-02-25 DIAGNOSIS — M25.562 ACUTE PAIN OF LEFT KNEE: Primary | ICD-10-CM

## 2022-02-25 DIAGNOSIS — Z00.00 ANNUAL PHYSICAL EXAM: ICD-10-CM

## 2022-02-25 DIAGNOSIS — E55.9 VITAMIN D DEFICIENCY: ICD-10-CM

## 2022-02-25 PROCEDURE — 3075F SYST BP GE 130 - 139MM HG: CPT | Performed by: FAMILY MEDICINE

## 2022-02-25 PROCEDURE — 99214 OFFICE O/P EST MOD 30 MIN: CPT | Performed by: FAMILY MEDICINE

## 2022-02-25 PROCEDURE — 3078F DIAST BP <80 MM HG: CPT | Performed by: FAMILY MEDICINE

## 2022-02-25 PROCEDURE — 3008F BODY MASS INDEX DOCD: CPT | Performed by: FAMILY MEDICINE

## 2022-02-25 RX ORDER — EMPAGLIFLOZIN 25 MG/1
1 TABLET, FILM COATED ORAL DAILY
COMMUNITY
Start: 2022-01-20

## 2022-02-25 NOTE — PATIENT INSTRUCTIONS
rec ortho eval left knee    Ok for handicap parking short term pending knee care    Continue diabetic educations    Continue present medication      Recheck physical Jessica months, labs then

## 2022-04-18 RX ORDER — DESVENLAFAXINE 50 MG/1
TABLET, EXTENDED RELEASE ORAL
Qty: 90 TABLET | Refills: 1 | Status: SHIPPED | OUTPATIENT
Start: 2022-04-18

## 2022-04-18 NOTE — TELEPHONE ENCOUNTER
Future appt: Your appointments     Date & Time Appointment Department [facility])    May 12, 2022 10:00 AM CDT Sleep Follow Up with Tacy Rinne, [address], [place] ([facility])        Jun 24, 2022  9:30 AM CDT Laboratory Visit with REF [facility] ([facility])        Jul 08, 2022 10:30 AM CDT Physical - Established with Karen Marroquin MD [address] [facility] ([facility])            [address] [facility]  [facility] [id number] [phone]  [address] Ref Lab [address]         Last Appointment with provider:   2/25/2022 for medication follow up. Last physical was on 6/29/21. Last appointment at [facility]:  2/25/2022  Cholesterol, Total (mg/dL)   Date Value   02/18/2022 163     HDL Cholesterol (mg/dL)   Date Value   02/18/2022 56     LDL Cholesterol (mg/dL)   Date Value   02/18/2022 76     Triglycerides (mg/dL)   Date Value   02/18/2022 186 (H)     Lab Results   Component Value Date     (H) 02/18/2022    A1C 6.3 (H) 02/18/2022     Lab Results   Component Value Date    T4F 1.3 04/24/2020    TSH 0.630 04/24/2020       No follow-ups on file.

## 2022-04-25 RX ORDER — ZOLPIDEM TARTRATE 12.5 MG/1
TABLET, FILM COATED, EXTENDED RELEASE ORAL
Qty: 90 TABLET | Refills: 1 | Status: SHIPPED | OUTPATIENT
Start: 2022-04-25

## 2022-04-25 NOTE — TELEPHONE ENCOUNTER
Future appt: Your appointments     Date & Time Appointment Department [facility])    May 12, 2022 10:00 AM CDT Sleep Follow Up with Nathan Braun, [address], [address] ([facility])        Jun 24, 2022  9:30 AM CDT Laboratory Visit with REF [facility] ([facility] Ref Lab [place])        Jul 08, 2022 10:30 AM CDT Physical - Established with Jacqu[facility] ([facility])            [facility]  [address] [phone]  [address] [facility] Reference Lab  EDW Ref Lab [place]  [facility] [id number]  [phone]        Last Appointment with provider:   11/11/2021 IRENE  Last appointment at [facility] [place]:  2/25/2022  Cholesterol, Total (mg/dL)   Date Value   02/18/2022 163     HDL Cholesterol (mg/dL)   Date Value   02/18/2022 56     LDL Cholesterol (mg/dL)   Date Value   02/18/2022 76     Triglycerides (mg/dL)   Date Value   02/18/2022 186 (H)     Lab Results   Component Value Date     (H) 02/18/2022    A1C 6.3 (H) 02/18/2022     Lab Results   Component Value Date    T4F 1.3 04/24/2020    TSH 0.630 04/24/2020       No follow-ups on file.

## 2022-05-12 ENCOUNTER — OFFICE VISIT (OUTPATIENT)
Dept: FAMILY MEDICINE CLINIC | Facility: CLINIC | Age: 62
End: 2022-05-12
Payer: COMMERCIAL

## 2022-05-12 VITALS
HEART RATE: 68 BPM | BODY MASS INDEX: 43.81 KG/M2 | TEMPERATURE: 97 F | DIASTOLIC BLOOD PRESSURE: 84 MMHG | RESPIRATION RATE: 18 BRPM | HEIGHT: 66.25 IN | OXYGEN SATURATION: 96 % | SYSTOLIC BLOOD PRESSURE: 128 MMHG | WEIGHT: 272.63 LBS

## 2022-05-12 DIAGNOSIS — G47.33 OSA (OBSTRUCTIVE SLEEP APNEA): Primary | ICD-10-CM

## 2022-05-12 PROCEDURE — 3008F BODY MASS INDEX DOCD: CPT | Performed by: NURSE PRACTITIONER

## 2022-05-12 PROCEDURE — 3074F SYST BP LT 130 MM HG: CPT | Performed by: NURSE PRACTITIONER

## 2022-05-12 PROCEDURE — 3079F DIAST BP 80-89 MM HG: CPT | Performed by: NURSE PRACTITIONER

## 2022-05-12 PROCEDURE — 99214 OFFICE O/P EST MOD 30 MIN: CPT | Performed by: NURSE PRACTITIONER

## 2022-06-08 ENCOUNTER — TELEPHONE (OUTPATIENT)
Dept: FAMILY MEDICINE CLINIC | Facility: CLINIC | Age: 62
End: 2022-06-08

## 2022-06-08 DIAGNOSIS — G47.33 OSA (OBSTRUCTIVE SLEEP APNEA): Primary | ICD-10-CM

## 2022-06-08 NOTE — TELEPHONE ENCOUNTER
----- Message from PAULA Hidalgo sent at 5/12/2022 10:27 AM CDT -----    ----- Message -----  From: PAULA Clemente  Sent: 5/12/2022  10:23 AM CDT  To: PAULA Hidalgo    Check leaks

## 2022-06-08 NOTE — TELEPHONE ENCOUNTER
Please let patient know that there is still almost an hour and a half of weeks nightly.   Will order mask fit at [facility]

## 2022-06-24 ENCOUNTER — LABORATORY ENCOUNTER (OUTPATIENT)
Dept: LAB | Age: 62
End: 2022-06-24
Attending: FAMILY MEDICINE
Payer: COMMERCIAL

## 2022-06-24 DIAGNOSIS — E11.9 CONTROLLED TYPE 2 DIABETES MELLITUS WITHOUT COMPLICATION, WITHOUT LONG-TERM CURRENT USE OF INSULIN (HCC): ICD-10-CM

## 2022-06-24 DIAGNOSIS — E53.8 B12 DEFICIENCY: ICD-10-CM

## 2022-06-24 DIAGNOSIS — Z83.2 FAMILY HISTORY OF FACTOR V LEIDEN MUTATION: ICD-10-CM

## 2022-06-24 DIAGNOSIS — Z00.00 ANNUAL PHYSICAL EXAM: ICD-10-CM

## 2022-06-24 DIAGNOSIS — E55.9 VITAMIN D DEFICIENCY: ICD-10-CM

## 2022-06-24 LAB
ALBUMIN SERPL-MCNC: 3.6 G/DL (ref 3.4–5)
ALBUMIN/GLOB SERPL: 1 {RATIO} (ref 1–2)
ALP LIVER SERPL-CCNC: 99 U/L
ALT SERPL-CCNC: 25 U/L
ANION GAP SERPL CALC-SCNC: 6 MMOL/L (ref 0–18)
AST SERPL-CCNC: 15 U/L (ref 15–37)
BASOPHILS # BLD AUTO: 0.07 X10(3) UL (ref 0–0.2)
BASOPHILS NFR BLD AUTO: 0.9 %
BILIRUB SERPL-MCNC: 0.4 MG/DL (ref 0.1–2)
BILIRUB UR QL STRIP.AUTO: NEGATIVE
BUN BLD-MCNC: 19 MG/DL (ref 7–18)
CALCIUM BLD-MCNC: 8.7 MG/DL (ref 8.5–10.1)
CHLORIDE SERPL-SCNC: 106 MMOL/L (ref 98–112)
CHOLEST SERPL-MCNC: 164 MG/DL (ref ?–200)
CO2 SERPL-SCNC: 28 MMOL/L (ref 21–32)
COLOR UR AUTO: YELLOW
CREAT BLD-MCNC: 0.89 MG/DL
CREAT UR-SCNC: 153 MG/DL
EOSINOPHIL # BLD AUTO: 0.21 X10(3) UL (ref 0–0.7)
EOSINOPHIL NFR BLD AUTO: 2.7 %
ERYTHROCYTE [DISTWIDTH] IN BLOOD BY AUTOMATED COUNT: 14.7 %
EST. AVERAGE GLUCOSE BLD GHB EST-MCNC: 140 MG/DL (ref 68–126)
FASTING PATIENT LIPID ANSWER: YES
FASTING STATUS PATIENT QL REPORTED: YES
GLOBULIN PLAS-MCNC: 3.5 G/DL (ref 2.8–4.4)
GLUCOSE BLD-MCNC: 118 MG/DL (ref 70–99)
GLUCOSE UR STRIP.AUTO-MCNC: 150 MG/DL
HBA1C MFR BLD: 6.5 % (ref ?–5.7)
HCT VFR BLD AUTO: 44.4 %
HDLC SERPL-MCNC: 52 MG/DL (ref 40–59)
HGB BLD-MCNC: 14.2 G/DL
IMM GRANULOCYTES # BLD AUTO: 0.02 X10(3) UL (ref 0–1)
IMM GRANULOCYTES NFR BLD: 0.3 %
LDLC SERPL CALC-MCNC: 78 MG/DL (ref ?–100)
LYMPHOCYTES # BLD AUTO: 2.48 X10(3) UL (ref 1–4)
LYMPHOCYTES NFR BLD AUTO: 32.2 %
MAGNESIUM SERPL-MCNC: 1.9 MG/DL (ref 1.6–2.6)
MCH RBC QN AUTO: 29.6 PG (ref 26–34)
MCHC RBC AUTO-ENTMCNC: 32 G/DL (ref 31–37)
MCV RBC AUTO: 92.7 FL
MICROALBUMIN UR-MCNC: 1.57 MG/DL
MICROALBUMIN/CREAT 24H UR-RTO: 10.3 UG/MG (ref ?–30)
MONOCYTES # BLD AUTO: 0.45 X10(3) UL (ref 0.1–1)
MONOCYTES NFR BLD AUTO: 5.9 %
NEUTROPHILS # BLD AUTO: 4.46 X10 (3) UL (ref 1.5–7.7)
NEUTROPHILS # BLD AUTO: 4.46 X10(3) UL (ref 1.5–7.7)
NEUTROPHILS NFR BLD AUTO: 58 %
NITRITE UR QL STRIP.AUTO: NEGATIVE
NONHDLC SERPL-MCNC: 112 MG/DL (ref ?–130)
OSMOLALITY SERPL CALC.SUM OF ELEC: 293 MOSM/KG (ref 275–295)
PH UR STRIP.AUTO: 6 [PH] (ref 5–8)
PLATELET # BLD AUTO: 268 10(3)UL (ref 150–450)
POTASSIUM SERPL-SCNC: 4 MMOL/L (ref 3.5–5.1)
PROT SERPL-MCNC: 7.1 G/DL (ref 6.4–8.2)
PROT UR STRIP.AUTO-MCNC: NEGATIVE MG/DL
RBC # BLD AUTO: 4.79 X10(6)UL
RBC UR QL AUTO: NEGATIVE
SODIUM SERPL-SCNC: 140 MMOL/L (ref 136–145)
SP GR UR STRIP.AUTO: 1.03 (ref 1–1.03)
TRIGL SERPL-MCNC: 204 MG/DL (ref 30–149)
TSI SER-ACNC: 1.99 MIU/ML (ref 0.36–3.74)
UROBILINOGEN UR STRIP.AUTO-MCNC: <2 MG/DL
VIT B12 SERPL-MCNC: 276 PG/ML (ref 193–986)
VIT D+METAB SERPL-MCNC: 40.3 NG/ML (ref 30–100)
VLDLC SERPL CALC-MCNC: 32 MG/DL (ref 0–30)
WBC # BLD AUTO: 7.7 X10(3) UL (ref 4–11)
YEAST UR QL: PRESENT /HPF

## 2022-06-24 PROCEDURE — 3044F HG A1C LEVEL LT 7.0%: CPT | Performed by: FAMILY MEDICINE

## 2022-06-24 PROCEDURE — 82306 VITAMIN D 25 HYDROXY: CPT | Performed by: FAMILY MEDICINE

## 2022-06-24 PROCEDURE — G0452 MOLECULAR PATHOLOGY INTERPR: HCPCS | Performed by: FAMILY MEDICINE

## 2022-06-24 PROCEDURE — 80050 GENERAL HEALTH PANEL: CPT | Performed by: FAMILY MEDICINE

## 2022-06-24 PROCEDURE — 82570 ASSAY OF URINE CREATININE: CPT | Performed by: FAMILY MEDICINE

## 2022-06-24 PROCEDURE — 83036 HEMOGLOBIN GLYCOSYLATED A1C: CPT | Performed by: FAMILY MEDICINE

## 2022-06-24 PROCEDURE — 82043 UR ALBUMIN QUANTITATIVE: CPT | Performed by: FAMILY MEDICINE

## 2022-06-24 PROCEDURE — 87086 URINE CULTURE/COLONY COUNT: CPT | Performed by: FAMILY MEDICINE

## 2022-06-24 PROCEDURE — 82607 VITAMIN B-12: CPT | Performed by: FAMILY MEDICINE

## 2022-06-24 PROCEDURE — 3061F NEG MICROALBUMINURIA REV: CPT | Performed by: FAMILY MEDICINE

## 2022-06-24 PROCEDURE — 83735 ASSAY OF MAGNESIUM: CPT | Performed by: FAMILY MEDICINE

## 2022-06-24 PROCEDURE — 80061 LIPID PANEL: CPT | Performed by: FAMILY MEDICINE

## 2022-06-24 PROCEDURE — 81001 URINALYSIS AUTO W/SCOPE: CPT | Performed by: FAMILY MEDICINE

## 2022-06-27 ENCOUNTER — TELEPHONE (OUTPATIENT)
Dept: FAMILY MEDICINE CLINIC | Facility: CLINIC | Age: 62
End: 2022-06-27

## 2022-06-27 LAB — F5 P.R506Q BLD/T QL: NORMAL

## 2022-06-27 NOTE — TELEPHONE ENCOUNTER
----- Message from Trenton Larsen MD sent at 6/27/2022  2:02 PM CDT -----  Negative and reasuring  Results forwarded to patient via [employer] system. Patient encouraged to call for any questions or concerns.

## 2022-06-27 NOTE — TELEPHONE ENCOUNTER
----- Message from Bree Quintana MD sent at 6/27/2022  7:51 AM CDT -----  Laboratory results reviewed. Hemoglobin A1c stable at 6.5. Urine microalbumin negative. B12 level low at 276. Magnesium normal.  Thyroid function normal.  Lipid profile shows elevated triglycerides cholesterol normal.  Chemistry profile has glucose 118. Liver and kidney function normal.  CBC normal.  Vitamin D normal.  Urinalysis with glucose and leukocyte Estrace. There is also many squamous cells. Urine culture indicates contamination. Encourage close monitoring of glucose. Will discuss with patient laboratory results at upcoming appointment.   Future Appointments  7/8/2022   10:30 AM   Aggie Villalobos MD     [facility] [place]        [facility] [place]  9/15/2022  11:00 AM   France Soares APRN     [facility] [place]        [facility] [place]

## 2022-06-29 ENCOUNTER — PATIENT MESSAGE (OUTPATIENT)
Dept: FAMILY MEDICINE CLINIC | Facility: CLINIC | Age: 62
End: 2022-06-29

## 2022-06-30 NOTE — TELEPHONE ENCOUNTER
From: Katya Hamm  To: Ricco Black MD  Sent: 6/29/2022 6:20 PM CDT  Subject: 2nd Covid booster    I have my yearly physical July 8th. Is there a chance I can get my 2nd booster that day?  Rachel Noriega is what I have been getting in the past

## 2022-06-30 NOTE — TELEPHONE ENCOUNTER
2/25/2022(PX)    Future appt: Your appointments     Date & Time Appointment Department [facility])    Jul 08, 2022 10:30 AM CDT Physical - Established with Desiree Gan MD [address] [facility], [place] ([facility])        Sep 15, 2022 11:00 AM CDT Sleep Follow Up with Germain Xiong, [address], [place] ([facility])            25 [facility] [place]  [place] [address] [phone]  [phone]        Last Appointment with provider:   1/08/0378(TR)  Last appointment at [facility] [place]:  5/12/2022(IRENE)  Cholesterol, Total (mg/dL)   Date Value   06/24/2022 164     HDL Cholesterol (mg/dL)   Date Value   06/24/2022 52     LDL Cholesterol (mg/dL)   Date Value   06/24/2022 78     Triglycerides (mg/dL)   Date Value   06/24/2022 204 (H)     Lab Results   Component Value Date     (H) 06/24/2022    A1C 6.5 (H) 06/24/2022     Lab Results   Component Value Date    T4F 1.3 04/24/2020    TSH 1.990 06/24/2022       No follow-ups on file.

## 2022-07-01 RX ORDER — POTASSIUM CHLORIDE 1500 MG/1
TABLET, EXTENDED RELEASE ORAL
Qty: 90 TABLET | Refills: 0 | Status: SHIPPED | OUTPATIENT
Start: 2022-07-01

## 2022-07-08 ENCOUNTER — OFFICE VISIT (OUTPATIENT)
Dept: FAMILY MEDICINE CLINIC | Facility: CLINIC | Age: 62
End: 2022-07-08
Payer: COMMERCIAL

## 2022-07-08 VITALS
DIASTOLIC BLOOD PRESSURE: 64 MMHG | BODY MASS INDEX: 43.52 KG/M2 | HEIGHT: 66 IN | TEMPERATURE: 98 F | HEART RATE: 69 BPM | SYSTOLIC BLOOD PRESSURE: 112 MMHG | WEIGHT: 270.81 LBS | OXYGEN SATURATION: 99 % | RESPIRATION RATE: 18 BRPM

## 2022-07-08 DIAGNOSIS — E89.0 POSTOPERATIVE HYPOTHYROIDISM: ICD-10-CM

## 2022-07-08 DIAGNOSIS — E55.9 VITAMIN D DEFICIENCY: ICD-10-CM

## 2022-07-08 DIAGNOSIS — G47.61 PERIODIC LIMB MOVEMENT DISORDER: ICD-10-CM

## 2022-07-08 DIAGNOSIS — C44.622 SQUAMOUS CELL CANCER OF SKIN OF UPPER ARM, RIGHT: ICD-10-CM

## 2022-07-08 DIAGNOSIS — G47.33 OSA (OBSTRUCTIVE SLEEP APNEA): ICD-10-CM

## 2022-07-08 DIAGNOSIS — M51.36 DEGENERATIVE DISC DISEASE, LUMBAR: ICD-10-CM

## 2022-07-08 DIAGNOSIS — N95.2 POSTMENOPAUSAL ATROPHIC VAGINITIS: ICD-10-CM

## 2022-07-08 DIAGNOSIS — C43.9 MALIGNANT MELANOMA, UNSPECIFIED SITE (HCC): ICD-10-CM

## 2022-07-08 DIAGNOSIS — C73 MALIGNANT NEOPLASM OF THYROID GLAND (HCC): ICD-10-CM

## 2022-07-08 DIAGNOSIS — Z00.00 ANNUAL PHYSICAL EXAM: Primary | ICD-10-CM

## 2022-07-08 DIAGNOSIS — E78.00 PURE HYPERCHOLESTEROLEMIA: ICD-10-CM

## 2022-07-08 DIAGNOSIS — M19.90 ARTHRITIS: ICD-10-CM

## 2022-07-08 DIAGNOSIS — E20.9 HYPOPARATHYROIDISM, UNSPECIFIED HYPOPARATHYROIDISM TYPE (HCC): ICD-10-CM

## 2022-07-08 DIAGNOSIS — I10 BENIGN ESSENTIAL HYPERTENSION: ICD-10-CM

## 2022-07-08 DIAGNOSIS — E11.9 CONTROLLED TYPE 2 DIABETES MELLITUS WITHOUT COMPLICATION, WITHOUT LONG-TERM CURRENT USE OF INSULIN (HCC): ICD-10-CM

## 2022-07-08 DIAGNOSIS — E53.8 B12 DEFICIENCY: ICD-10-CM

## 2022-07-08 DIAGNOSIS — E66.01 CLASS 3 SEVERE OBESITY DUE TO EXCESS CALORIES WITH SERIOUS COMORBIDITY AND BODY MASS INDEX (BMI) OF 45.0 TO 49.9 IN ADULT (HCC): ICD-10-CM

## 2022-07-08 PROCEDURE — 88175 CYTOPATH C/V AUTO FLUID REDO: CPT | Performed by: FAMILY MEDICINE

## 2022-07-08 PROCEDURE — 3074F SYST BP LT 130 MM HG: CPT | Performed by: FAMILY MEDICINE

## 2022-07-08 PROCEDURE — 99396 PREV VISIT EST AGE 40-64: CPT | Performed by: FAMILY MEDICINE

## 2022-07-08 PROCEDURE — 3078F DIAST BP <80 MM HG: CPT | Performed by: FAMILY MEDICINE

## 2022-07-08 PROCEDURE — 87624 HPV HI-RISK TYP POOLED RSLT: CPT | Performed by: FAMILY MEDICINE

## 2022-07-08 PROCEDURE — 3008F BODY MASS INDEX DOCD: CPT | Performed by: FAMILY MEDICINE

## 2022-07-08 RX ORDER — METOPROLOL SUCCINATE 50 MG/1
50 TABLET, EXTENDED RELEASE ORAL DAILY
Qty: 90 TABLET | Refills: 3 | Status: SHIPPED | OUTPATIENT
Start: 2022-07-08

## 2022-07-08 RX ORDER — TRIAMTERENE AND HYDROCHLOROTHIAZIDE 37.5; 25 MG/1; MG/1
1 CAPSULE ORAL DAILY
Qty: 90 CAPSULE | Refills: 3 | Status: SHIPPED | OUTPATIENT
Start: 2022-07-08

## 2022-07-08 NOTE — PATIENT INSTRUCTIONS
rec ADD b12 - suppliment-- 1000mg  Daily    rec eye exam-- have send record 4/22    Pap today    Normal factor 5 leiden!     Continue other medications    Continue activity    F.u ortho for knee as needed    Recheck 6 months with labs

## 2022-07-11 LAB — HPV I/H RISK 1 DNA SPEC QL NAA+PROBE: NEGATIVE

## 2022-07-14 ENCOUNTER — TELEPHONE (OUTPATIENT)
Dept: FAMILY MEDICINE CLINIC | Facility: CLINIC | Age: 62
End: 2022-07-14

## 2022-07-14 NOTE — TELEPHONE ENCOUNTER
----- Message from Jovany Swain MD sent at 7/14/2022  1:15 PM CDT -----  Pap normal, negative HPV. Reassuring test result. Recheck as needed. Results forwarded to patient via [employer] system. Patient encouraged to call for any questions or concerns.

## 2022-07-24 DIAGNOSIS — E78.00 PURE HYPERCHOLESTEROLEMIA: ICD-10-CM

## 2022-07-25 RX ORDER — ATORVASTATIN CALCIUM 20 MG/1
TABLET, FILM COATED ORAL
Qty: 90 TABLET | Refills: 1 | Status: SHIPPED | OUTPATIENT
Start: 2022-07-25

## 2022-07-25 NOTE — TELEPHONE ENCOUNTER
Future appt: Your appointments     Date & Time Appointment Department [facility])    Sep 15, 2022 11:00 AM CDT Sleep Follow Up with Lizzeth Milian, [address], Mary [place]aux ([place])        Dec 09, 2022  9:30 AM CST Laboratory Visit with REF Kesha Amaya [facility] ([facility] [place])        Dec 16, 2022 11:30 AM CST Follow Up Visit with Christine Shin MD 25 [facility], [place] ([place])            25 [facility] [place]  [address] [phone]  [phone] [facility]  [facility] [place]  [address] [address]  [phone]        Last Appointment with provider:   7/8/2022Loel Sit- return to office recommended in 6 months  Last appointment at [facility] [place]:  7/8/2022    Atorvastatin refilled 7/29/21 for one year  Cholesterol, Total (mg/dL)   Date Value   06/24/2022 164     HDL Cholesterol (mg/dL)   Date Value   06/24/2022 52     LDL Cholesterol (mg/dL)   Date Value   06/24/2022 78     Triglycerides (mg/dL)   Date Value   06/24/2022 204 (H)     Lab Results   Component Value Date     (H) 06/24/2022    A1C 6.5 (H) 06/24/2022     Lab Results   Component Value Date    T4F 1.3 04/24/2020    TSH 1.990 06/24/2022       No follow-ups on file.

## 2022-09-15 ENCOUNTER — OFFICE VISIT (OUTPATIENT)
Dept: FAMILY MEDICINE CLINIC | Facility: CLINIC | Age: 62
End: 2022-09-15
Payer: COMMERCIAL

## 2022-09-15 VITALS
TEMPERATURE: 98 F | BODY MASS INDEX: 44 KG/M2 | HEART RATE: 75 BPM | WEIGHT: 270.63 LBS | OXYGEN SATURATION: 97 % | DIASTOLIC BLOOD PRESSURE: 70 MMHG | SYSTOLIC BLOOD PRESSURE: 120 MMHG | RESPIRATION RATE: 18 BRPM

## 2022-09-15 DIAGNOSIS — G47.33 OSA (OBSTRUCTIVE SLEEP APNEA): Primary | ICD-10-CM

## 2022-09-15 DIAGNOSIS — F51.01 PRIMARY INSOMNIA: ICD-10-CM

## 2022-09-15 PROCEDURE — 3078F DIAST BP <80 MM HG: CPT | Performed by: NURSE PRACTITIONER

## 2022-09-15 PROCEDURE — 3074F SYST BP LT 130 MM HG: CPT | Performed by: NURSE PRACTITIONER

## 2022-09-15 PROCEDURE — 99214 OFFICE O/P EST MOD 30 MIN: CPT | Performed by: NURSE PRACTITIONER

## 2022-09-15 RX ORDER — UBIDECARENONE 75 MG
250 CAPSULE ORAL DAILY
COMMUNITY

## 2022-09-22 RX ORDER — RIZATRIPTAN BENZOATE 10 MG/1
TABLET ORAL
Qty: 30 TABLET | Refills: 8 | Status: SHIPPED | OUTPATIENT
Start: 2022-09-22

## 2022-09-22 NOTE — TELEPHONE ENCOUNTER
Future appt: Your appointments     Date & Time Appointment Department [facility])    Dec 09, 2022  9:30 AM CST Laboratory Visit with REF Jhonny Bal Reference [facility] ([facility] [place])        Dec 16, 2022 11:30 AM CST Follow Up Visit with Rick Herman MD [address] [place] [address] Group [place])        Apr 06, 2023 10:30 AM CDT Sleep Follow Up with Noel Horn, [address] [place] [address] ([facility])            25 [facility] [place]  [facility] [facility] [address]  [address] Ref Lab [place]  [address] [address]  [phone]        Last Appointment with provider:   7/8/2022 for annual physical.  Last appointment at [facility] [place]:  9/15/2022  Cholesterol, Total (mg/dL)   Date Value   06/24/2022 164     HDL Cholesterol (mg/dL)   Date Value   06/24/2022 52     LDL Cholesterol (mg/dL)   Date Value   06/24/2022 78     Triglycerides (mg/dL)   Date Value   06/24/2022 204 (H)     Lab Results   Component Value Date     (H) 06/24/2022    A1C 6.5 (H) 06/24/2022     Lab Results   Component Value Date    T4F 1.3 04/24/2020    TSH 1.990 06/24/2022       No follow-ups on file.

## 2022-09-28 RX ORDER — POTASSIUM CHLORIDE 1500 MG/1
TABLET, EXTENDED RELEASE ORAL
Qty: 90 TABLET | Refills: 3 | Status: SHIPPED | OUTPATIENT
Start: 2022-09-28

## 2022-09-28 NOTE — TELEPHONE ENCOUNTER
Future appt: Your appointments     Date & Time Appointment Department [facility])    Dec 09, 2022  9:30 AM CST Laboratory Visit with REF Humberto Arteaga [facility] ([facility] [place])        Dec 16, 2022 11:30 AM CST Follow Up Visit with Mandy Malloy MD [address] [place] [address] [address] [place] [address] Group [place])        Apr 06, 2023 10:30 AM CDT Sleep Follow Up with Speedy Jordan, [address] [facility] ([facility])            25 [facility] [place]  [address] [address]  [phone] Kamlesh Jasso Reference Lab  ED[facility] Ref Lab [place]  [address] [address]  [phone]        Last Appointment with provider:   7/8/2022Rgulshan Barnes  [place] [facility] [place]:  9/15/2022      Klor-Clon refilled 7/1/22 for #90, 0 refills    Cholesterol, Total (mg/dL)   Date Value   06/24/2022 164     HDL Cholesterol (mg/dL)   Date Value   06/24/2022 52     LDL Cholesterol (mg/dL)   Date Value   06/24/2022 78     Triglycerides (mg/dL)   Date Value   06/24/2022 204 (H)     Lab Results   Component Value Date     (H) 06/24/2022    A1C 6.5 (H) 06/24/2022     Lab Results   Component Value Date    T4F 1.3 04/24/2020    TSH 1.990 06/24/2022       No follow-ups on file.

## 2022-10-17 RX ORDER — DESVENLAFAXINE 50 MG/1
TABLET, EXTENDED RELEASE ORAL
Qty: 90 TABLET | Refills: 3 | Status: SHIPPED | OUTPATIENT
Start: 2022-10-17

## 2022-10-17 NOTE — TELEPHONE ENCOUNTER
Future appt: Your appointments     Date & Time Appointment Department [facility])    Dec 09, 2022  9:30 AM CST Laboratory Visit with REF Rose Lennon [facility] ([facility] [place])        Dec 16, 2022 11:30 AM CST Follow Up Visit with Xavier Mckeon MD 25 [facility] [place])        Apr 06, 2023 10:30 AM CDT Sleep Follow Up with Brinda Park, [address] [facility] ([facility])            25 [facility]  [address] [address]  [address] Ref Lab [place]  [address] [address]  [phone]        Last Appointment with provider:   7/8/2022 for annual physical.   Last appointment at [facility] [place]:  9/15/2022  Cholesterol, Total (mg/dL)   Date Value   06/24/2022 164     HDL Cholesterol (mg/dL)   Date Value   06/24/2022 52     LDL Cholesterol (mg/dL)   Date Value   06/24/2022 78     Triglycerides (mg/dL)   Date Value   06/24/2022 204 (H)     Lab Results   Component Value Date     (H) 06/24/2022    A1C 6.5 (H) 06/24/2022     Lab Results   Component Value Date    T4F 1.3 04/24/2020    TSH 1.990 06/24/2022       No follow-ups on file.

## 2022-10-19 RX ORDER — ZOLPIDEM TARTRATE 12.5 MG/1
TABLET, FILM COATED, EXTENDED RELEASE ORAL
Qty: 90 TABLET | Refills: 1 | Status: SHIPPED | OUTPATIENT
Start: 2022-10-19

## 2022-10-19 NOTE — TELEPHONE ENCOUNTER
Future appt: Your appointments     Date & Time Appointment Department [facility])    Dec 09, 2022  9:30 AM CST Laboratory Visit with REF Humberto Nails [facility] ([facility] Ref [facility] [place])        Dec 16, 2022 11:30 AM CST Follow Up Visit with [facility] [place])        Apr 06, 2023 10:30 AM CDT Sleep Follow Up with Speedy Jordan, [address], [place] ([place])            25 [facility] [place]  [place] [facility] [address]  [address] Ref Lab [place]  [place] [facility] [address]  [phone]        Last Appointment with provider:   9/15/2022 sleep follow up  [place] [facility] [place]:  9/15/2022  Cholesterol, Total (mg/dL)   Date Value   06/24/2022 164     HDL Cholesterol (mg/dL)   Date Value   06/24/2022 52     LDL Cholesterol (mg/dL)   Date Value   06/24/2022 78     Triglycerides (mg/dL)   Date Value   06/24/2022 204 (H)     Lab Results   Component Value Date     (H) 06/24/2022    A1C 6.5 (H) 06/24/2022     Lab Results   Component Value Date    T4F 1.3 04/24/2020    TSH 1.990 06/24/2022       No follow-ups on file.

## 2022-10-20 ENCOUNTER — PATIENT MESSAGE (OUTPATIENT)
Dept: FAMILY MEDICINE CLINIC | Facility: CLINIC | Age: 62
End: 2022-10-20

## 2022-10-20 NOTE — TELEPHONE ENCOUNTER
From: Flores Baum  To: Brandi Fatima MD  Sent: 10/20/2022 3:50 PM CDT  Subject: add vaccinations to my file    Please add my flu vaccine 10/05/22  COVID-booster 10/22/22  to my records. Attached is the confirmation from [facility].   Thanks
patient/family

## 2022-12-01 ENCOUNTER — PATIENT MESSAGE (OUTPATIENT)
Dept: FAMILY MEDICINE CLINIC | Facility: CLINIC | Age: 62
End: 2022-12-01

## 2022-12-01 NOTE — TELEPHONE ENCOUNTER
From: Charles Haddad  To: Fuentes Nicole MD  Sent: 12/1/2022 9:59 AM CST  Subject: Additional Lab test    Dr Mely Moreira has requested an additional test to my upcoming lab work at your facility on Dec 12. He uses your facilities results in my appointment in his office. (insurance thing) I have attached the paperwork he send home with me at my last appointment. He told me to pass it onto you. thyroglobulin level to look for any leftover cancer.   Please and thank you  Candelario Frank

## 2022-12-01 NOTE — TELEPHONE ENCOUNTER
Orders noted are for:    1. T4, Free - Dx C73  2. TSH - Dx C73  3. Vitamin D - Dx E89.2  4. CMP - Dx E11.9  5. Thyroglobulin, Comprehensive Panel - Dx C73  6. A1C - Dx E11.9     Please advise.

## 2022-12-12 ENCOUNTER — LABORATORY ENCOUNTER (OUTPATIENT)
Dept: LAB | Age: 62
End: 2022-12-12
Attending: FAMILY MEDICINE
Payer: COMMERCIAL

## 2022-12-12 DIAGNOSIS — E11.9 CONTROLLED TYPE 2 DIABETES MELLITUS WITHOUT COMPLICATION, WITHOUT LONG-TERM CURRENT USE OF INSULIN (HCC): ICD-10-CM

## 2022-12-12 DIAGNOSIS — C73 MALIGNANT NEOPLASM OF THYROID GLAND (HCC): Primary | ICD-10-CM

## 2022-12-12 LAB
ALBUMIN SERPL-MCNC: 3.6 G/DL (ref 3.4–5)
ALBUMIN/GLOB SERPL: 0.9 {RATIO} (ref 1–2)
ALP LIVER SERPL-CCNC: 105 U/L
ALT SERPL-CCNC: 28 U/L
ANION GAP SERPL CALC-SCNC: 8 MMOL/L (ref 0–18)
AST SERPL-CCNC: 16 U/L (ref 15–37)
BILIRUB SERPL-MCNC: 0.6 MG/DL (ref 0.1–2)
BILIRUB UR QL STRIP.AUTO: NEGATIVE
BUN BLD-MCNC: 17 MG/DL (ref 7–18)
CALCIUM BLD-MCNC: 8.6 MG/DL (ref 8.5–10.1)
CHLORIDE SERPL-SCNC: 103 MMOL/L (ref 98–112)
CHOLEST SERPL-MCNC: 165 MG/DL (ref ?–200)
CLARITY UR REFRACT.AUTO: CLEAR
CO2 SERPL-SCNC: 26 MMOL/L (ref 21–32)
COLOR UR AUTO: YELLOW
CREAT BLD-MCNC: 0.8 MG/DL
EST. AVERAGE GLUCOSE BLD GHB EST-MCNC: 137 MG/DL (ref 68–126)
FASTING PATIENT LIPID ANSWER: YES
FASTING STATUS PATIENT QL REPORTED: YES
GFR SERPLBLD BASED ON 1.73 SQ M-ARVRAT: 82 ML/MIN/1.73M2 (ref 60–?)
GLOBULIN PLAS-MCNC: 3.9 G/DL (ref 2.8–4.4)
GLUCOSE BLD-MCNC: 108 MG/DL (ref 70–99)
GLUCOSE UR STRIP.AUTO-MCNC: 500 MG/DL
HBA1C MFR BLD: 6.4 % (ref ?–5.7)
HDLC SERPL-MCNC: 61 MG/DL (ref 40–59)
LDLC SERPL CALC-MCNC: 80 MG/DL (ref ?–100)
LEUKOCYTE ESTERASE UR QL STRIP.AUTO: NEGATIVE
NITRITE UR QL STRIP.AUTO: NEGATIVE
NONHDLC SERPL-MCNC: 104 MG/DL (ref ?–130)
OSMOLALITY SERPL CALC.SUM OF ELEC: 286 MOSM/KG (ref 275–295)
PH UR STRIP.AUTO: 7 [PH] (ref 5–8)
POTASSIUM SERPL-SCNC: 4.1 MMOL/L (ref 3.5–5.1)
PROT SERPL-MCNC: 7.5 G/DL (ref 6.4–8.2)
PROT UR STRIP.AUTO-MCNC: NEGATIVE MG/DL
RBC UR QL AUTO: NEGATIVE
SODIUM SERPL-SCNC: 137 MMOL/L (ref 136–145)
SP GR UR STRIP.AUTO: 1.02 (ref 1–1.03)
T4 FREE SERPL-MCNC: 1.3 NG/DL (ref 0.8–1.7)
TRIGL SERPL-MCNC: 138 MG/DL (ref 30–149)
TSI SER-ACNC: 0.76 MIU/ML (ref 0.36–3.74)
UROBILINOGEN UR STRIP.AUTO-MCNC: 0.2 MG/DL
VIT D+METAB SERPL-MCNC: 40 NG/ML (ref 30–100)
VLDLC SERPL CALC-MCNC: 22 MG/DL (ref 0–30)

## 2022-12-12 PROCEDURE — 81003 URINALYSIS AUTO W/O SCOPE: CPT | Performed by: FAMILY MEDICINE

## 2022-12-12 PROCEDURE — 80053 COMPREHEN METABOLIC PANEL: CPT | Performed by: FAMILY MEDICINE

## 2022-12-12 PROCEDURE — 80061 LIPID PANEL: CPT | Performed by: FAMILY MEDICINE

## 2022-12-12 PROCEDURE — 83036 HEMOGLOBIN GLYCOSYLATED A1C: CPT | Performed by: FAMILY MEDICINE

## 2022-12-14 LAB
THYROGLOBULIN AB: <0.9 IU/ML
THYROGLOBULIN, SERUM OR PLASMA: 0.1 NG/ML

## 2022-12-16 ENCOUNTER — OFFICE VISIT (OUTPATIENT)
Dept: FAMILY MEDICINE CLINIC | Facility: CLINIC | Age: 62
End: 2022-12-16
Payer: COMMERCIAL

## 2022-12-16 VITALS
OXYGEN SATURATION: 97 % | RESPIRATION RATE: 20 BRPM | HEART RATE: 84 BPM | BODY MASS INDEX: 44.3 KG/M2 | DIASTOLIC BLOOD PRESSURE: 84 MMHG | TEMPERATURE: 97 F | HEIGHT: 66 IN | WEIGHT: 275.63 LBS | SYSTOLIC BLOOD PRESSURE: 132 MMHG

## 2022-12-16 DIAGNOSIS — E11.9 CONTROLLED TYPE 2 DIABETES MELLITUS WITHOUT COMPLICATION, WITHOUT LONG-TERM CURRENT USE OF INSULIN (HCC): ICD-10-CM

## 2022-12-16 DIAGNOSIS — M51.36 DEGENERATIVE DISC DISEASE, LUMBAR: ICD-10-CM

## 2022-12-16 DIAGNOSIS — M25.562 CHRONIC PAIN OF LEFT KNEE: ICD-10-CM

## 2022-12-16 DIAGNOSIS — E53.8 B12 DEFICIENCY: ICD-10-CM

## 2022-12-16 DIAGNOSIS — E78.00 PURE HYPERCHOLESTEROLEMIA: ICD-10-CM

## 2022-12-16 DIAGNOSIS — G89.29 CHRONIC PAIN OF LEFT KNEE: ICD-10-CM

## 2022-12-16 DIAGNOSIS — E89.0 POSTOPERATIVE HYPOTHYROIDISM: ICD-10-CM

## 2022-12-16 DIAGNOSIS — E66.01 CLASS 3 SEVERE OBESITY DUE TO EXCESS CALORIES WITH SERIOUS COMORBIDITY AND BODY MASS INDEX (BMI) OF 45.0 TO 49.9 IN ADULT (HCC): ICD-10-CM

## 2022-12-16 DIAGNOSIS — Z23 NEED FOR TETANUS BOOSTER: ICD-10-CM

## 2022-12-16 DIAGNOSIS — I10 BENIGN ESSENTIAL HYPERTENSION: Primary | ICD-10-CM

## 2022-12-16 RX ORDER — DESLORATADINE 5 MG/1
5 TABLET ORAL DAILY
Qty: 90 TABLET | Refills: 1 | Status: SHIPPED | OUTPATIENT
Start: 2022-12-16

## 2022-12-16 NOTE — PATIENT INSTRUCTIONS
Continue nasal spray    Change antihistamine to clarinex    Consider allergy eval    Rec ortho-- plan for scope    Activity as tolerates

## 2022-12-17 PROBLEM — G89.29 CHRONIC PAIN OF LEFT KNEE: Status: ACTIVE | Noted: 2022-12-17

## 2022-12-17 PROBLEM — M25.562 CHRONIC PAIN OF LEFT KNEE: Status: ACTIVE | Noted: 2022-12-17

## 2022-12-21 RX ORDER — METOPROLOL SUCCINATE 50 MG/1
TABLET, EXTENDED RELEASE ORAL
Qty: 90 TABLET | Refills: 3 | OUTPATIENT
Start: 2022-12-21

## 2023-01-10 ENCOUNTER — PATIENT MESSAGE (OUTPATIENT)
Dept: FAMILY MEDICINE CLINIC | Facility: CLINIC | Age: 63
End: 2023-01-10

## 2023-01-11 NOTE — TELEPHONE ENCOUNTER
From: Charles Haddad  To: PAULA Obrien  Sent: 1/10/2023 5:09 PM CST  Subject: Medicare Part D    Hi Cony. I am not due to see you until April 6. I turn 70 April 19th. I need to have my new drug program selected in the first of march. I have been on Zolpidem for years. It is in the high tier and as you mentioned earlier not affordable through medicare drug plans. Can you tell me what you\"may\" be moving me to? So I can put it into the websites tool to help me pick my Part D plan. I understand it is just a suggestion and nothing is set until I meet with you in April. Any direction you could give me would be helpful. ..  Candelario Frank

## 2023-01-19 DIAGNOSIS — E78.00 PURE HYPERCHOLESTEROLEMIA: ICD-10-CM

## 2023-01-20 RX ORDER — ATORVASTATIN CALCIUM 20 MG/1
TABLET, FILM COATED ORAL
Qty: 90 TABLET | Refills: 1 | Status: SHIPPED | OUTPATIENT
Start: 2023-01-20

## 2023-01-20 NOTE — TELEPHONE ENCOUNTER
Future appt: Your appointments     Date & Time Appointment Department [facility])    Apr 06, 2023 10:30 AM CDT Sleep Follow Up with PAULA Cabral [facility] [place] ([place])            [address] [place] [address] Jul[place]  [phone]  [phone]        Last Appointment with provider:   12/16/2022  Last appointment at [facility]:  12/16/2022  Last px: 7/8/22      Atorvastatin refilled on 7/25/22 for #90, 1 refill  Cholesterol, Total (mg/dL)   Date Value   12/12/2022 165     HDL Cholesterol (mg/dL)   Date Value   12/12/2022 61 (H)     LDL Cholesterol (mg/dL)   Date Value   12/12/2022 80     Triglycerides (mg/dL)   Date Value   12/12/2022 138     Lab Results   Component Value Date     (H) 12/12/2022    A1C 6.4 (H) 12/12/2022     Lab Results   Component Value Date    T4F 1.3 12/12/2022    TSH 0.760 12/12/2022       No follow-ups on file.

## 2023-02-16 ENCOUNTER — TELEMEDICINE (OUTPATIENT)
Dept: FAMILY MEDICINE CLINIC | Facility: CLINIC | Age: 63
End: 2023-02-16

## 2023-02-16 VITALS — BODY MASS INDEX: 44.2 KG/M2 | HEIGHT: 66 IN | WEIGHT: 275 LBS

## 2023-02-16 DIAGNOSIS — I10 BENIGN ESSENTIAL HYPERTENSION: ICD-10-CM

## 2023-02-16 DIAGNOSIS — U07.1 COVID-19: Primary | ICD-10-CM

## 2023-02-16 DIAGNOSIS — E66.01 CLASS 3 SEVERE OBESITY DUE TO EXCESS CALORIES WITH SERIOUS COMORBIDITY AND BODY MASS INDEX (BMI) OF 45.0 TO 49.9 IN ADULT (HCC): ICD-10-CM

## 2023-02-16 DIAGNOSIS — E11.9 CONTROLLED TYPE 2 DIABETES MELLITUS WITHOUT COMPLICATION, WITHOUT LONG-TERM CURRENT USE OF INSULIN (HCC): ICD-10-CM

## 2023-02-16 DIAGNOSIS — E78.00 PURE HYPERCHOLESTEROLEMIA: ICD-10-CM

## 2023-02-16 PROCEDURE — 99214 OFFICE O/P EST MOD 30 MIN: CPT | Performed by: NURSE PRACTITIONER

## 2023-02-16 NOTE — PATIENT INSTRUCTIONS
Take deep breaths     Sleep on your stomach -      Rest, increase fluids, salt water gargles ,ludwin pot (use distilled water) or saline nasal spray, Mucinex-DM, Tylenol/Ibuprofen, follow up if symptoms persist or increase. Do not take Lipitor (atorvastatin) while on this medication - check with the pharmacy for other interactions. Be up and moving     Aspirin every day -    Start Paxlovid     Monitor oxygen above 90%  - if it drops below- go to ER     If any chest pain/pressure/heaviness, shortness of breath, pain radiating into neck/jaw/arm, sweating, nausea/vomiting, severe headache, weakness on half of your body, vision changes, etc. then call 911.

## 2023-02-23 NOTE — PATIENT INSTRUCTIONS
Rest, try heat &/or ice    Naprosyn twice a day  With food- don't take advil, aleve, motrin, etc.   Ok to take tylenol. Take medrol dose pack as directed with food.      Follow up with physical therapy - follow up if symptoms persist or increase - cons
No

## 2023-03-13 ENCOUNTER — TELEPHONE (OUTPATIENT)
Dept: FAMILY MEDICINE CLINIC | Facility: CLINIC | Age: 63
End: 2023-03-13

## 2023-03-13 RX ORDER — METOPROLOL SUCCINATE 50 MG/1
50 TABLET, EXTENDED RELEASE ORAL DAILY
Qty: 90 TABLET | Refills: 0 | Status: SHIPPED | OUTPATIENT
Start: 2023-03-13

## 2023-03-13 RX ORDER — METOPROLOL SUCCINATE 50 MG/1
50 TABLET, EXTENDED RELEASE ORAL DAILY
Qty: 90 TABLET | Refills: 3 | OUTPATIENT
Start: 2023-03-13

## 2023-03-13 NOTE — TELEPHONE ENCOUNTER
Needs metoprolol 50 mg refill     [facility] denied because there was no response from  to ok    Phone [phone]

## 2023-03-13 NOTE — TELEPHONE ENCOUNTER
Future appt: Your appointments     Date & Time Appointment Department [facility])    Apr 06, 2023 10:30 AM CDT Sleep Follow Up with George France, APRN [address], [place] ([place])            [address], Leonardo Neely  [phone]        Last Appointment with provider:   12/16/2022  Last appointment at [facility]:  12/16/2022    Metoprolol refilled 7/8/22 for 1 year    Pt contacted-  Pt states [facility] canceled her RX for Metoprolol- pt is unsure why  Pt states her insurance changes to Medicare on 4/1. Pt states she will call back to schedule her appt for wellness as soon as she has her coverage info. Pt has enough medication to get her thru next Thursday- then pt will be out. Pt needs one more 90 day supply sent to [facility]          Cholesterol, Total (mg/dL)   Date Value   12/12/2022 165     HDL Cholesterol (mg/dL)   Date Value   12/12/2022 61 (H)     LDL Cholesterol (mg/dL)   Date Value   12/12/2022 80     Triglycerides (mg/dL)   Date Value   12/12/2022 138     Lab Results   Component Value Date     (H) 12/12/2022    A1C 6.4 (H) 12/12/2022     Lab Results   Component Value Date    T4F 1.3 12/12/2022    TSH 0.760 12/12/2022       No follow-ups on file.

## 2023-03-30 NOTE — TELEPHONE ENCOUNTER
Future appt: Your appointments     Date & Time Appointment Department [facility])    2023 10:30 AM CDT Sleep Follow Up with PAULA [place] ([place])            Zack Barrera, [facility] [place]  [facility] [address]  [phone]        Last Appointment with provider:   Visit date not found  Last appointment at [facility] [place]:  2022  Cholesterol, Total (mg/dL)   Date Value   2022 165     HDL Cholesterol (mg/dL)   Date Value   2022 61 (H)     LDL Cholesterol (mg/dL)   Date Value   2022 80     Triglycerides (mg/dL)   Date Value   2022 138     Lab Results   Component Value Date     (H) 2022    A1C 6.4 (H) 2022     Lab Results   Component Value Date    T4F 1.3 2022    TSH 0.760 2022       No follow-ups on file.

## 2023-03-31 RX ORDER — ZOLPIDEM TARTRATE 12.5 MG/1
TABLET, FILM COATED, EXTENDED RELEASE ORAL
Qty: 90 TABLET | Refills: 1 | Status: SHIPPED | OUTPATIENT
Start: 2023-03-31

## 2023-04-06 ENCOUNTER — OFFICE VISIT (OUTPATIENT)
Dept: FAMILY MEDICINE CLINIC | Facility: CLINIC | Age: 63
End: 2023-04-06
Payer: MEDICARE

## 2023-04-06 ENCOUNTER — LAB ENCOUNTER (OUTPATIENT)
Dept: LAB | Age: 63
End: 2023-04-06
Attending: FAMILY MEDICINE
Payer: MEDICARE

## 2023-04-06 VITALS
SYSTOLIC BLOOD PRESSURE: 128 MMHG | OXYGEN SATURATION: 98 % | RESPIRATION RATE: 20 BRPM | WEIGHT: 274.38 LBS | HEIGHT: 66 IN | BODY MASS INDEX: 44.1 KG/M2 | HEART RATE: 80 BPM | TEMPERATURE: 97 F | DIASTOLIC BLOOD PRESSURE: 86 MMHG

## 2023-04-06 DIAGNOSIS — Z98.84 LAP-BAND SURGERY STATUS: ICD-10-CM

## 2023-04-06 DIAGNOSIS — G47.33 OSA (OBSTRUCTIVE SLEEP APNEA): Primary | ICD-10-CM

## 2023-04-06 DIAGNOSIS — E55.9 VITAMIN D DEFICIENCY: ICD-10-CM

## 2023-04-06 DIAGNOSIS — G47.61 PERIODIC LIMB MOVEMENT DISORDER: ICD-10-CM

## 2023-04-06 DIAGNOSIS — E53.8 B12 DEFICIENCY: ICD-10-CM

## 2023-04-06 DIAGNOSIS — R63.8 OTHER SYMPTOMS AND SIGNS CONCERNING FOOD AND FLUID INTAKE: ICD-10-CM

## 2023-04-06 DIAGNOSIS — G47.33 OSA (OBSTRUCTIVE SLEEP APNEA): ICD-10-CM

## 2023-04-06 LAB
DEPRECATED HBV CORE AB SER IA-ACNC: 36.9 NG/ML
IRON SATN MFR SERPL: 19 %
IRON SERPL-MCNC: 81 UG/DL
TIBC SERPL-MCNC: 416 UG/DL (ref 240–450)
TRANSFERRIN SERPL-MCNC: 279 MG/DL (ref 200–360)
VIT B12 SERPL-MCNC: 928 PG/ML (ref 193–986)
VIT D+METAB SERPL-MCNC: 25.7 NG/ML (ref 30–100)

## 2023-04-06 PROCEDURE — 83550 IRON BINDING TEST: CPT

## 2023-04-06 PROCEDURE — 36415 COLL VENOUS BLD VENIPUNCTURE: CPT

## 2023-04-06 PROCEDURE — 82728 ASSAY OF FERRITIN: CPT

## 2023-04-06 PROCEDURE — 99215 OFFICE O/P EST HI 40 MIN: CPT | Performed by: NURSE PRACTITIONER

## 2023-04-06 PROCEDURE — 82607 VITAMIN B-12: CPT

## 2023-04-06 PROCEDURE — 83540 ASSAY OF IRON: CPT

## 2023-04-06 PROCEDURE — 82306 VITAMIN D 25 HYDROXY: CPT

## 2023-04-06 RX ORDER — ESCITALOPRAM OXALATE 10 MG/1
10 TABLET ORAL DAILY
Qty: 90 TABLET | Refills: 0 | Status: SHIPPED | OUTPATIENT
Start: 2023-04-06

## 2023-04-07 ENCOUNTER — TELEPHONE (OUTPATIENT)
Dept: FAMILY MEDICINE CLINIC | Facility: CLINIC | Age: 63
End: 2023-04-07

## 2023-04-07 DIAGNOSIS — E53.8 B12 DEFICIENCY: Primary | ICD-10-CM

## 2023-04-07 DIAGNOSIS — R79.0 LOW IRON STORES: ICD-10-CM

## 2023-04-07 DIAGNOSIS — E55.9 VITAMIN D DEFICIENCY: ICD-10-CM

## 2023-04-07 DIAGNOSIS — F51.01 PRIMARY INSOMNIA: ICD-10-CM

## 2023-04-07 RX ORDER — ERGOCALCIFEROL 1.25 MG/1
50000 CAPSULE ORAL WEEKLY
Qty: 12 CAPSULE | Refills: 0 | Status: SHIPPED | OUTPATIENT
Start: 2023-04-07 | End: 2023-06-24

## 2023-04-07 NOTE — TELEPHONE ENCOUNTER
----- Message from PAULA Cisneros sent at 4/7/2023  8:03 AM CDT -----  B12 is excellent. Iron is a low normal. Recommend iron 325 mg daily to be taken with vitamin C.   Vitamin D is very low. Recommend vitamin D 50,000 U weekly for 12 weeks  (prescription) and then an additional 2000 U daily (over the counter) to what has been taking. Recheck vitamin D in 6 months. Prescription sent to [facility]   Note to Lobo.

## 2023-05-02 ENCOUNTER — OFFICE VISIT (OUTPATIENT)
Dept: FAMILY MEDICINE CLINIC | Facility: CLINIC | Age: 63
End: 2023-05-02
Payer: MEDICARE

## 2023-05-02 VITALS
DIASTOLIC BLOOD PRESSURE: 74 MMHG | OXYGEN SATURATION: 96 % | TEMPERATURE: 98 F | SYSTOLIC BLOOD PRESSURE: 126 MMHG | HEART RATE: 66 BPM | BODY MASS INDEX: 42.91 KG/M2 | RESPIRATION RATE: 18 BRPM | WEIGHT: 276.63 LBS | HEIGHT: 67.4 IN

## 2023-05-02 DIAGNOSIS — Z12.11 COLON CANCER SCREENING: ICD-10-CM

## 2023-05-02 DIAGNOSIS — E11.9 CONTROLLED TYPE 2 DIABETES MELLITUS WITHOUT COMPLICATION, WITHOUT LONG-TERM CURRENT USE OF INSULIN (HCC): ICD-10-CM

## 2023-05-02 DIAGNOSIS — Z98.84 LAP-BAND SURGERY STATUS: ICD-10-CM

## 2023-05-02 DIAGNOSIS — Z78.0 POST-MENOPAUSAL: ICD-10-CM

## 2023-05-02 DIAGNOSIS — R01.1 HEART MURMUR: ICD-10-CM

## 2023-05-02 DIAGNOSIS — Z00.00 ENCOUNTER FOR ANNUAL HEALTH EXAMINATION: Primary | ICD-10-CM

## 2023-05-02 DIAGNOSIS — E66.01 CLASS 3 SEVERE OBESITY DUE TO EXCESS CALORIES WITH SERIOUS COMORBIDITY AND BODY MASS INDEX (BMI) OF 45.0 TO 49.9 IN ADULT (HCC): ICD-10-CM

## 2023-05-02 PROBLEM — S83.232A COMPLEX TEAR OF MEDIAL MENISCUS OF LEFT KNEE AS CURRENT INJURY: Status: ACTIVE | Noted: 2023-01-19

## 2023-05-02 PROCEDURE — 90677 PCV20 VACCINE IM: CPT | Performed by: FAMILY MEDICINE

## 2023-05-02 PROCEDURE — G0403 EKG FOR INITIAL PREVENT EXAM: HCPCS | Performed by: FAMILY MEDICINE

## 2023-05-02 PROCEDURE — G0402 INITIAL PREVENTIVE EXAM: HCPCS | Performed by: FAMILY MEDICINE

## 2023-05-02 PROCEDURE — G0009 ADMIN PNEUMOCOCCAL VACCINE: HCPCS | Performed by: FAMILY MEDICINE

## 2023-05-02 NOTE — PROGRESS NOTES
Pt tolerated Prevnar 20 well. Pt denied any reactions. No reactions noted at injection site. Pt left without incident.

## 2023-05-09 ENCOUNTER — OFFICE VISIT (OUTPATIENT)
Dept: FAMILY MEDICINE CLINIC | Facility: CLINIC | Age: 63
End: 2023-05-09
Payer: MEDICARE

## 2023-05-09 VITALS
BODY MASS INDEX: 42.87 KG/M2 | HEIGHT: 67.4 IN | HEART RATE: 73 BPM | OXYGEN SATURATION: 95 % | WEIGHT: 276.38 LBS | TEMPERATURE: 97 F | DIASTOLIC BLOOD PRESSURE: 68 MMHG | SYSTOLIC BLOOD PRESSURE: 120 MMHG | RESPIRATION RATE: 18 BRPM

## 2023-05-09 DIAGNOSIS — R79.0 LOW IRON STORES: ICD-10-CM

## 2023-05-09 DIAGNOSIS — G47.61 PERIODIC LIMB MOVEMENT DISORDER: ICD-10-CM

## 2023-05-09 DIAGNOSIS — G47.33 OSA (OBSTRUCTIVE SLEEP APNEA): Primary | ICD-10-CM

## 2023-05-09 DIAGNOSIS — F51.01 PRIMARY INSOMNIA: ICD-10-CM

## 2023-05-09 DIAGNOSIS — E55.9 VITAMIN D DEFICIENCY: ICD-10-CM

## 2023-05-09 PROCEDURE — 99214 OFFICE O/P EST MOD 30 MIN: CPT | Performed by: FAMILY MEDICINE

## 2023-05-09 RX ORDER — ZOLPIDEM TARTRATE 10 MG/1
10 TABLET ORAL NIGHTLY
Qty: 90 TABLET | Refills: 0 | Status: SHIPPED | OUTPATIENT
Start: 2023-05-09 | End: 2024-05-03

## 2023-05-09 NOTE — PATIENT INSTRUCTIONS
[employer]. net  (0528 access number) for vitamins     Iron stores are low. [employer] net  (0528 access number) for vitamins  Iron plus c twice a day x 3  Months and recheck iron. This may be contributing to RLS and PLMD.   Goal to treat to  Ferritin of 50 and Iron Saturation of 35%. How to avoid insomnia  Wake up at the same time each day. Maintaining a regular sleep schedule is important to good sleep. Eliminate alcohol and stimulants like nicotine and caffeine. The effects of caffeine can last for several hours, perhaps up to 24 hours, so the chances of it affecting sleep are significant. Caffeine may not only cause difficulty initiating sleep, but may also cause frequent awakenings. Alcohol may have a sedative effect for the first few hours following consumption, but it can then lead to frequent arousals and a non-restful night's sleep. Medications that act as stimulants, such as decongestants  can also disrupt your sleep and should be avoided. Use of technology such as computers, media and smart phones prior to bedtime should be avoided. Do not look at the clock. Clock watching has been associated with increased anxiety during sleep and worsening of insomnia. The bright lights from modern digital clocks have been associated with poor sleep. Clocks should be turned away from you and if the light is too bright, they should be covered. Eliminate naps. While napping seems like a proper way to catch up on missed sleep, it is not always so. It is important to establish and maintain a regular sleep pattern and train oneself to associate sleep with cues like darkness and a consistent bedtime. Napping can affect the quality of nighttime sleep. Exercise regularly. Regular exercise can improve sleep quality and duration. However, exercising immediately before bedtime can have a stimulant effect on the body and should be avoided.  Try to finish exercising at least three hours before you plan to retire for the night. Limit activities in bed. The bed is for sleeping and having sex and that's it. Do not use the computer or smart phone, watch TV, catch up on work or listen to music while in bed. All these activities can increase alertness and make it difficult to fall asleep. Do not eat or drink right before going to bed. Eating a late dinner or snacking before going to bed can activate the digestive system and keep you up. Drinking a lot of fluids prior to bed can overwhelm the bladder, requiring frequent visits to the bathroom that disturb your sleep. Make your sleeping environment comfortable. Temperature, lighting, and noise should be controlled to make the bedroom conducive to falling (and staying) asleep. Ideally your bedroom temperature should be 66-68 degrees. Rooms that are warm will make it more difficult to fall asleep. Do not allow your pet to sleep on your bed or in the bedroom. If your bed partner is disruptive due to snoring or constant movement, consider sleeping alone in a separate bedroom. Get all your worrying over with before you go to bed. If you find you lay in bed thinking about tomorrow, consider setting aside a period of time -- perhaps after dinner -- to review the day and to make plans for the next day. The goal is to avoid doing these things while trying to fall asleep. It is also useful to make a list of, say, work-related tasks for the next day before leaving work. That, at least, eliminates one set of concerns. Reduce stress. There are a number of relaxation therapies and stress reduction methods you may want to try to relax the mind and the body before going to bed. Examples include progressive muscle relaxation (perhaps with audio tapes), deep breathing techniques, imagery, and meditation.

## 2023-05-11 ENCOUNTER — LABORATORY ENCOUNTER (OUTPATIENT)
Dept: LAB | Age: 63
End: 2023-05-11
Attending: FAMILY MEDICINE
Payer: MEDICARE

## 2023-05-11 DIAGNOSIS — Z00.00 ENCOUNTER FOR ANNUAL HEALTH EXAMINATION: ICD-10-CM

## 2023-05-11 DIAGNOSIS — E11.9 CONTROLLED TYPE 2 DIABETES MELLITUS WITHOUT COMPLICATION, WITHOUT LONG-TERM CURRENT USE OF INSULIN (HCC): ICD-10-CM

## 2023-05-11 LAB
ALBUMIN SERPL-MCNC: 3.5 G/DL (ref 3.4–5)
ALBUMIN/GLOB SERPL: 0.9 {RATIO} (ref 1–2)
ALP LIVER SERPL-CCNC: 95 U/L
ALT SERPL-CCNC: 25 U/L
ANION GAP SERPL CALC-SCNC: 4 MMOL/L (ref 0–18)
AST SERPL-CCNC: 16 U/L (ref 15–37)
BASOPHILS # BLD AUTO: 0.06 X10(3) UL (ref 0–0.2)
BASOPHILS NFR BLD AUTO: 0.8 %
BILIRUB SERPL-MCNC: 0.4 MG/DL (ref 0.1–2)
BILIRUB UR QL STRIP.AUTO: NEGATIVE
BUN BLD-MCNC: 16 MG/DL (ref 7–18)
CALCIUM BLD-MCNC: 7.9 MG/DL (ref 8.5–10.1)
CHLORIDE SERPL-SCNC: 104 MMOL/L (ref 98–112)
CHOLEST SERPL-MCNC: 145 MG/DL (ref ?–200)
CLARITY UR REFRACT.AUTO: CLEAR
CO2 SERPL-SCNC: 28 MMOL/L (ref 21–32)
COLOR UR AUTO: YELLOW
CREAT BLD-MCNC: 0.85 MG/DL
CREAT UR-SCNC: 120 MG/DL
EOSINOPHIL # BLD AUTO: 0.23 X10(3) UL (ref 0–0.7)
EOSINOPHIL NFR BLD AUTO: 3.2 %
ERYTHROCYTE [DISTWIDTH] IN BLOOD BY AUTOMATED COUNT: 14.3 %
EST. AVERAGE GLUCOSE BLD GHB EST-MCNC: 140 MG/DL (ref 68–126)
FASTING PATIENT LIPID ANSWER: YES
FASTING STATUS PATIENT QL REPORTED: YES
GFR SERPLBLD BASED ON 1.73 SQ M-ARVRAT: 76 ML/MIN/1.73M2 (ref 60–?)
GLOBULIN PLAS-MCNC: 3.7 G/DL (ref 2.8–4.4)
GLUCOSE BLD-MCNC: 122 MG/DL (ref 70–99)
GLUCOSE UR STRIP.AUTO-MCNC: >=500 MG/DL
HBA1C MFR BLD: 6.5 % (ref ?–5.7)
HCT VFR BLD AUTO: 43.9 %
HDLC SERPL-MCNC: 56 MG/DL (ref 40–59)
HGB BLD-MCNC: 14.2 G/DL
IMM GRANULOCYTES # BLD AUTO: 0.03 X10(3) UL (ref 0–1)
IMM GRANULOCYTES NFR BLD: 0.4 %
KETONES UR STRIP.AUTO-MCNC: NEGATIVE MG/DL
LDLC SERPL CALC-MCNC: 59 MG/DL (ref ?–100)
LEUKOCYTE ESTERASE UR QL STRIP.AUTO: NEGATIVE
LYMPHOCYTES # BLD AUTO: 2.27 X10(3) UL (ref 1–4)
LYMPHOCYTES NFR BLD AUTO: 31.7 %
MCH RBC QN AUTO: 29.5 PG (ref 26–34)
MCHC RBC AUTO-ENTMCNC: 32.3 G/DL (ref 31–37)
MCV RBC AUTO: 91.3 FL
MICROALBUMIN UR-MCNC: 0.58 MG/DL
MICROALBUMIN/CREAT 24H UR-RTO: 4.8 UG/MG (ref ?–30)
MONOCYTES # BLD AUTO: 0.4 X10(3) UL (ref 0.1–1)
MONOCYTES NFR BLD AUTO: 5.6 %
NEUTROPHILS # BLD AUTO: 4.16 X10 (3) UL (ref 1.5–7.7)
NEUTROPHILS # BLD AUTO: 4.16 X10(3) UL (ref 1.5–7.7)
NEUTROPHILS NFR BLD AUTO: 58.3 %
NITRITE UR QL STRIP.AUTO: NEGATIVE
NONHDLC SERPL-MCNC: 89 MG/DL (ref ?–130)
OSMOLALITY SERPL CALC.SUM OF ELEC: 284 MOSM/KG (ref 275–295)
PH UR STRIP.AUTO: 6 [PH] (ref 5–8)
PLATELET # BLD AUTO: 259 10(3)UL (ref 150–450)
POTASSIUM SERPL-SCNC: 3.8 MMOL/L (ref 3.5–5.1)
PROT SERPL-MCNC: 7.2 G/DL (ref 6.4–8.2)
PROT UR STRIP.AUTO-MCNC: NEGATIVE MG/DL
RBC # BLD AUTO: 4.81 X10(6)UL
RBC UR QL AUTO: NEGATIVE
SODIUM SERPL-SCNC: 136 MMOL/L (ref 136–145)
SP GR UR STRIP.AUTO: 1.03 (ref 1–1.03)
TRIGL SERPL-MCNC: 182 MG/DL (ref 30–149)
TSI SER-ACNC: 0.39 MIU/ML (ref 0.36–3.74)
UROBILINOGEN UR STRIP.AUTO-MCNC: <2 MG/DL
VLDLC SERPL CALC-MCNC: 27 MG/DL (ref 0–30)
WBC # BLD AUTO: 7.2 X10(3) UL (ref 4–11)

## 2023-05-11 PROCEDURE — 82043 UR ALBUMIN QUANTITATIVE: CPT

## 2023-05-11 PROCEDURE — 83036 HEMOGLOBIN GLYCOSYLATED A1C: CPT

## 2023-05-11 PROCEDURE — 81003 URINALYSIS AUTO W/O SCOPE: CPT

## 2023-05-11 PROCEDURE — 36415 COLL VENOUS BLD VENIPUNCTURE: CPT

## 2023-05-11 PROCEDURE — 85025 COMPLETE CBC W/AUTO DIFF WBC: CPT

## 2023-05-11 PROCEDURE — 82570 ASSAY OF URINE CREATININE: CPT

## 2023-05-11 PROCEDURE — 80061 LIPID PANEL: CPT

## 2023-05-11 PROCEDURE — 84443 ASSAY THYROID STIM HORMONE: CPT

## 2023-05-11 PROCEDURE — 80053 COMPREHEN METABOLIC PANEL: CPT

## 2023-05-17 ENCOUNTER — TELEPHONE (OUTPATIENT)
Dept: FAMILY MEDICINE CLINIC | Facility: CLINIC | Age: 63
End: 2023-05-17

## 2023-05-17 NOTE — TELEPHONE ENCOUNTER
Pt had labs drawn 5/11/23. Received my chart notification re: un reviewed lab results. Pt contacted  Pt states she did see my chart lab report with MD notes.

## 2023-05-23 ENCOUNTER — TELEPHONE (OUTPATIENT)
Dept: FAMILY MEDICINE CLINIC | Facility: CLINIC | Age: 63
End: 2023-05-23

## 2023-05-30 ENCOUNTER — TELEPHONE (OUTPATIENT)
Dept: FAMILY MEDICINE CLINIC | Facility: CLINIC | Age: 63
End: 2023-05-30

## 2023-05-30 NOTE — TELEPHONE ENCOUNTER
Bone density scan reviewed. Finding:  NORMAL    Patient encouraged to engage in weightbearing exercises ideally walking and biking    All patients should ensure an adequate intake of dietary calcium and vitamin D. The NOF recommends adults under age 48 need 1000 mg of calcium and 400-800 international units of vitamin D daily. Patient's over the age of 48 are recommended to have 1200 mg of calcium and 800-1000 international units of vitamin D daily.

## 2023-06-21 RX ORDER — ESCITALOPRAM OXALATE 10 MG/1
10 TABLET ORAL DAILY
Qty: 90 TABLET | Refills: 0 | Status: SHIPPED | OUTPATIENT
Start: 2023-06-21

## 2023-06-21 NOTE — TELEPHONE ENCOUNTER
Future appt: Your appointments     Date & Time Appointment Department [facility])    Jul 11, 2023 10:20 AM CDT Sleep Follow Up with Evelia George MD [address] [facility], [place] ([facility])            [address] [facility], [facility] [place]  [place] [address] [address]  [phone]        Last Appointment with provider:   5/9/23 IRENE  Last appointment at [facility] [place]:  5/9/2023  Cholesterol, Total (mg/dL)   Date Value   05/11/2023 145     HDL Cholesterol (mg/dL)   Date Value   05/11/2023 56     LDL Cholesterol (mg/dL)   Date Value   05/11/2023 59     Triglycerides (mg/dL)   Date Value   05/11/2023 182 (H)     Lab Results   Component Value Date     (H) 05/11/2023    A1C 6.5 (H) 05/11/2023     Lab Results   Component Value Date    T4F 1.3 12/12/2022    TSH 0.389 05/11/2023       No follow-ups on file.

## 2023-06-23 RX ORDER — ZOLPIDEM TARTRATE 10 MG/1
10 TABLET ORAL NIGHTLY
Qty: 90 TABLET | Refills: 0 | Status: SHIPPED | OUTPATIENT
Start: 2023-06-23 | End: 2024-06-17

## 2023-06-23 NOTE — TELEPHONE ENCOUNTER
Future appt: Your appointments     Date & Time Appointment Department [facility])    Jul 11, 2023 10:20 AM CDT Sleep Follow Up with Rafaela Duggan MD [address] [facility], Randy Iverson ([place])            [address] [place] [phone]        Last Appointment with provider:   5/9/2023  Last appointment at [facility]:  5/9/2023  Last px: 5/2/2023    Cholesterol, Total (mg/dL)   Date Value   05/11/2023 145     HDL Cholesterol (mg/dL)   Date Value   05/11/2023 56     LDL Cholesterol (mg/dL)   Date Value   05/11/2023 59     Triglycerides (mg/dL)   Date Value   05/11/2023 182 (H)     Lab Results   Component Value Date     (H) 05/11/2023    A1C 6.5 (H) 05/11/2023     Lab Results   Component Value Date    T4F 1.3 12/12/2022    TSH 0.389 05/11/2023       No follow-ups on file.

## 2023-06-26 RX ORDER — METOPROLOL SUCCINATE 50 MG/1
50 TABLET, EXTENDED RELEASE ORAL DAILY
Qty: 90 TABLET | Refills: 1 | Status: SHIPPED | OUTPATIENT
Start: 2023-06-26

## 2023-06-26 RX ORDER — POTASSIUM CHLORIDE 20 MEQ/1
20 TABLET, EXTENDED RELEASE ORAL DAILY
Qty: 90 TABLET | Refills: 1 | Status: SHIPPED | OUTPATIENT
Start: 2023-06-26

## 2023-06-26 NOTE — TELEPHONE ENCOUNTER
Future appt: Your appointments       Date & Time Appointment Department [facility])    Jul 11, 2023 10:20 AM CDT Sleep Follow Up with Jodee Mayo MD [address] [place] [address], [place]man [place] [id number] [phone]          Last Appointment with provider:   5/2/2023; Plan for medical follow-up and recheck labs 6 months     Last appointment at [facility] [place]:  5/9/2023  Cholesterol, Total (mg/dL)   Date Value   05/11/2023 145     HDL Cholesterol (mg/dL)   Date Value   05/11/2023 56     LDL Cholesterol (mg/dL)   Date Value   05/11/2023 59     Triglycerides (mg/dL)   Date Value   05/11/2023 182 (H)     Lab Results   Component Value Date     (H) 05/11/2023    A1C 6.5 (H) 05/11/2023     Lab Results   Component Value Date    T4F 1.3 12/12/2022    TSH 0.389 05/11/2023     Last RF:  9/28/2022 and 3/13/2023    No follow-ups on file.

## 2023-06-30 ENCOUNTER — TELEPHONE (OUTPATIENT)
Dept: FAMILY MEDICINE CLINIC | Facility: CLINIC | Age: 63
End: 2023-06-30

## 2023-07-11 ENCOUNTER — OFFICE VISIT (OUTPATIENT)
Dept: FAMILY MEDICINE CLINIC | Facility: CLINIC | Age: 63
End: 2023-07-11
Payer: MEDICARE

## 2023-07-11 ENCOUNTER — LAB ENCOUNTER (OUTPATIENT)
Dept: LAB | Age: 63
End: 2023-07-11
Attending: FAMILY MEDICINE
Payer: MEDICARE

## 2023-07-11 VITALS
OXYGEN SATURATION: 96 % | DIASTOLIC BLOOD PRESSURE: 72 MMHG | SYSTOLIC BLOOD PRESSURE: 116 MMHG | HEART RATE: 63 BPM | RESPIRATION RATE: 18 BRPM | WEIGHT: 275.63 LBS | TEMPERATURE: 98 F | HEIGHT: 67 IN | BODY MASS INDEX: 43.26 KG/M2

## 2023-07-11 DIAGNOSIS — E20.9 HYPOPARATHYROIDISM, UNSPECIFIED HYPOPARATHYROIDISM TYPE (HCC): ICD-10-CM

## 2023-07-11 DIAGNOSIS — G47.33 OSA (OBSTRUCTIVE SLEEP APNEA): Primary | ICD-10-CM

## 2023-07-11 LAB
ALBUMIN SERPL-MCNC: 3.8 G/DL (ref 3.4–5)
ALBUMIN/GLOB SERPL: 0.9 {RATIO} (ref 1–2)
ALP LIVER SERPL-CCNC: 114 U/L
ALT SERPL-CCNC: 31 U/L
ANION GAP SERPL CALC-SCNC: 7 MMOL/L (ref 0–18)
AST SERPL-CCNC: 20 U/L (ref 15–37)
BILIRUB SERPL-MCNC: 0.5 MG/DL (ref 0.1–2)
BUN BLD-MCNC: 13 MG/DL (ref 7–18)
CALCIUM BLD-MCNC: 8.3 MG/DL (ref 8.5–10.1)
CHLORIDE SERPL-SCNC: 105 MMOL/L (ref 98–112)
CO2 SERPL-SCNC: 27 MMOL/L (ref 21–32)
CREAT BLD-MCNC: 0.96 MG/DL
FASTING STATUS PATIENT QL REPORTED: NO
GFR SERPLBLD BASED ON 1.73 SQ M-ARVRAT: 66 ML/MIN/1.73M2 (ref 60–?)
GLOBULIN PLAS-MCNC: 4.1 G/DL (ref 2.8–4.4)
GLUCOSE BLD-MCNC: 95 MG/DL (ref 70–99)
OSMOLALITY SERPL CALC.SUM OF ELEC: 288 MOSM/KG (ref 275–295)
POTASSIUM SERPL-SCNC: 3.6 MMOL/L (ref 3.5–5.1)
PROT SERPL-MCNC: 7.9 G/DL (ref 6.4–8.2)
SODIUM SERPL-SCNC: 139 MMOL/L (ref 136–145)

## 2023-07-11 PROCEDURE — 36415 COLL VENOUS BLD VENIPUNCTURE: CPT

## 2023-07-11 PROCEDURE — 99214 OFFICE O/P EST MOD 30 MIN: CPT | Performed by: FAMILY MEDICINE

## 2023-07-11 PROCEDURE — 80053 COMPREHEN METABOLIC PANEL: CPT

## 2023-07-11 RX ORDER — CYANOCOBALAMIN (VITAMIN B-12) 500 MCG
TABLET ORAL AS DIRECTED
COMMUNITY

## 2023-07-11 RX ORDER — POTASSIUM CHLORIDE 400 MEQ/1000ML
INJECTION, SOLUTION INTRAVENOUS
COMMUNITY

## 2023-07-18 ENCOUNTER — TELEPHONE (OUTPATIENT)
Dept: FAMILY MEDICINE CLINIC | Facility: CLINIC | Age: 63
End: 2023-07-18

## 2023-07-18 NOTE — TELEPHONE ENCOUNTER
Pt mailed letter requesting letter stating that she does not have any medical contraindications for bariatric surgery. Pt contacted-  Pt has met with Dr. Matthias Floyd and is currently in the process for scheduling surgery. Pt informed she would need pre-op appt for clearance. Pt has not scheduled surgery date as of yet. Pt instructed to call to schedule pre-op eval with available provider once appt for surgery scheduled. Also pt was asked to have bariatric center sent over pre-op orders. Pt agreed to do so.

## 2023-07-20 ENCOUNTER — PATIENT MESSAGE (OUTPATIENT)
Dept: FAMILY MEDICINE CLINIC | Facility: CLINIC | Age: 63
End: 2023-07-20

## 2023-07-21 NOTE — TELEPHONE ENCOUNTER
From: Darius Marc  To: Kerri Dey MD  Sent: 7/20/2023 5:03 PM CDT  Subject: New type of [address] Tera [place]. at my last appointment with you we discussed going to a different type of mask. [facility] had not called me, so I called them. They have not received a script for a fitting on a new mask. Talking to them further, I am not eligible for a new one until Aug 10th. I have a followup appointment with you on the 22nd. Do you want me to push that appointment out until September so I can get a new mask and get some numbers for you?   Thank You

## 2023-08-08 NOTE — TELEPHONE ENCOUNTER
Future appt: Your appointments       Date & Time Appointment Department [facility])    Sep 28, 2023  2:00 PM CDT Sleep Follow Up with Elvis Mauro MD [address], [place] ([facility])              [address], [place]  [place] [phone]  [phone]          Last Appointment with provider:   7/11/2023 Sleep follow up  Last appointment at [facility]:  7/11/2023  Cholesterol, Total (mg/dL)   Date Value   05/11/2023 145     HDL Cholesterol (mg/dL)   Date Value   05/11/2023 56     LDL Cholesterol (mg/dL)   Date Value   05/11/2023 59     Triglycerides (mg/dL)   Date Value   05/11/2023 182 (H)     Lab Results   Component Value Date     (H) 05/11/2023    A1C 6.5 (H) 05/11/2023     Lab Results   Component Value Date    T4F 1.3 12/12/2022    TSH 0.389 05/11/2023       No follow-ups on file.

## 2023-08-09 RX ORDER — ESCITALOPRAM OXALATE 10 MG/1
10 TABLET ORAL DAILY
Qty: 90 TABLET | Refills: 0 | Status: SHIPPED | OUTPATIENT
Start: 2023-08-09

## 2023-08-14 RX ORDER — ZOLPIDEM TARTRATE 10 MG/1
10 TABLET ORAL NIGHTLY
Qty: 90 TABLET | Refills: 0 | Status: SHIPPED | OUTPATIENT
Start: 2023-08-14

## 2023-08-14 NOTE — TELEPHONE ENCOUNTER
90 day rx given 6/23- request early. Pt sleep mananged Dr. Nicole Cisneros.     Future Appointments   Date Time Provider Lasha Tessy   9/28/2023  2:00 PM Sarah Nazario MD [facility]

## 2023-08-14 NOTE — TELEPHONE ENCOUNTER
Zolpidem: 6/23/23    No return date given. Future appt: Your appointments       Date & Time Appointment Department [facility])    Sep 28, 2023  2:00 PM CDT Sleep Follow Up with Allyssa Montgomery MD [address], [place] ([facility])              [address], [place]  [place] [address] [phone]  [phone]          Last Appointment with provider: 5/2/23    Last appointment at [facility]:  7/11/2023  Cholesterol, Total (mg/dL)   Date Value   05/11/2023 145     HDL Cholesterol (mg/dL)   Date Value   05/11/2023 56     LDL Cholesterol (mg/dL)   Date Value   05/11/2023 59     Triglycerides (mg/dL)   Date Value   05/11/2023 182 (H)     Lab Results   Component Value Date     (H) 05/11/2023    A1C 6.5 (H) 05/11/2023     Lab Results   Component Value Date    T4F 1.3 12/12/2022    TSH 0.389 05/11/2023       No follow-ups on file.

## 2023-08-25 ENCOUNTER — PATIENT MESSAGE (OUTPATIENT)
Dept: FAMILY MEDICINE CLINIC | Facility: CLINIC | Age: 63
End: 2023-08-25

## 2023-08-27 ENCOUNTER — PATIENT MESSAGE (OUTPATIENT)
Dept: FAMILY MEDICINE CLINIC | Facility: CLINIC | Age: 63
End: 2023-08-27

## 2023-08-27 DIAGNOSIS — I10 BENIGN ESSENTIAL HYPERTENSION: Primary | ICD-10-CM

## 2023-08-27 DIAGNOSIS — E78.00 PURE HYPERCHOLESTEROLEMIA: ICD-10-CM

## 2023-08-28 RX ORDER — METOPROLOL SUCCINATE 50 MG/1
50 TABLET, EXTENDED RELEASE ORAL DAILY
Qty: 90 TABLET | Refills: 1 | Status: SHIPPED | OUTPATIENT
Start: 2023-08-28

## 2023-08-28 RX ORDER — ATORVASTATIN CALCIUM 20 MG/1
20 TABLET, FILM COATED ORAL NIGHTLY
Qty: 90 TABLET | Refills: 1 | Status: SHIPPED | OUTPATIENT
Start: 2023-08-28

## 2023-08-28 RX ORDER — TRIAMTERENE AND HYDROCHLOROTHIAZIDE 37.5; 25 MG/1; MG/1
1 CAPSULE ORAL DAILY
Qty: 90 CAPSULE | Refills: 1 | Status: SHIPPED | OUTPATIENT
Start: 2023-08-28

## 2023-08-28 NOTE — TELEPHONE ENCOUNTER
From: Monique Evans  To: Bruna Daugherty MD  Sent: 8/25/2023 9:39 PM CDT  Subject: Follow up to July 11 appt    I just wanted to follow-up to our 07/11 appt. I went for the new mask fitting at [facility]. The mask we discussed did not work for me. I went back to my old type mask. I still use bipap nighty. The other thing is I ran out of the 10 mg ambien and substituted with the 12mg for 2 weeks. It is a long story with transferring the script to mail in. I will  the 10mg tomorrow at [place] and et back on track. I wanted to keep you informed of what was going on. I have an appt scheduled with you on Sept 28. Can we discuss these questions at that time? or do you want to see me earlier.  Thank Chilango Floyd

## 2023-08-28 NOTE — TELEPHONE ENCOUNTER
Future appt: Your appointments       Date & Time Appointment Department [facility])    Sep 28, 2023  2:00 PM CDT Sleep Follow Up with Yaquelin Almaraz MD [address], [place] ([place])    Sleep Patients:  Please bring your PAP device (no mask/hose) to each appointment unless instructed otherwise. [address]  [facility] [phone]  [phone]          Last Appointment with provider:   5/2/2023Yusuf Lee-  follow up with labs due in 6 months  [place] [facility] [place]:  7/11/2023    Triamt/hydrochlorothiazide refilled on 7/8/22 for one year- pt states she has 2 weeks left  Atorvastatin refilled on 1/20/23 for 6 month supply  Metoprolol refilled on 6/26/23 for 6 month supply    Pt also sent my chart message requesting refills to [facility]    Cholesterol, Total (mg/dL)   Date Value   05/11/2023 145     HDL Cholesterol (mg/dL)   Date Value   05/11/2023 56     LDL Cholesterol (mg/dL)   Date Value   05/11/2023 59     Triglycerides (mg/dL)   Date Value   05/11/2023 182 (H)     Lab Results   Component Value Date     (H) 05/11/2023    A1C 6.5 (H) 05/11/2023     Lab Results   Component Value Date    T4F 1.3 12/12/2022    TSH 0.389 05/11/2023       No follow-ups on file.

## 2023-08-28 NOTE — TELEPHONE ENCOUNTER
From: Belén Carrera  To: Marissa Waldrop MD  Sent: 8/27/2023 11:57 AM CDT  Subject: MEDICINE REFILLS    Some how in the \"welcome to medicare\" exchange, the elimination of [employer] and adding of [employer]. I believe my script was sent to the old company, that is why i have requested refills. Is there any way to delete the [employer] from my account? I checked the new account and none of the 3 are on file there.  I have enough for 2 weeks Please and thank you

## 2023-09-01 NOTE — TELEPHONE ENCOUNTER
She still has a high AHI but I believe it is due to her leak. Can we call rachel and see what options they can use with her?

## 2023-09-01 NOTE — TELEPHONE ENCOUNTER
Download in basket. Patient had to stop using the nasal pillows due to rubbing against a cartilage protrusion inside nostril. Went back to the nasal cushion. Patient left SD card with office in case an adjustment to pressures is needed. Call when ready for patient to .

## 2023-09-14 ENCOUNTER — TELEPHONE (OUTPATIENT)
Dept: FAMILY MEDICINE CLINIC | Facility: CLINIC | Age: 63
End: 2023-09-14

## 2023-09-14 ENCOUNTER — PATIENT MESSAGE (OUTPATIENT)
Dept: FAMILY MEDICINE CLINIC | Facility: CLINIC | Age: 63
End: 2023-09-14

## 2023-09-18 ENCOUNTER — PATIENT MESSAGE (OUTPATIENT)
Dept: FAMILY MEDICINE CLINIC | Facility: CLINIC | Age: 63
End: 2023-09-18

## 2023-09-18 NOTE — TELEPHONE ENCOUNTER
From: Jarek Degroot  To: Shanice Cruz  Sent: 9/18/2023 9:34 AM CDT  Subject: BiPap issue    New mask obtained 9/4 I slept really well, there was no card in the machine  9/5 picked up card from [facility] and put it in the machine. 9/6 started coughing, it got worse, extremely sore throat, congestion. i thought it was allergies. I took 3 covid tests in the next week all negative. The constant coughing is deep with nothing being coughed up, my voice is getting hoarse from coughing. Tracking everything back the only variable is the new mask and the SD card. I took the card out on Sat and Sunday nights. The coughing is almost gone. Congestion in nose is clearing up. I have to believe the card needs to be adjusted. I think it is putting too much air into my lungs and irritating it. I am not going to put the card back in until I let you look at the settings on it. I have a revisit with you scheduled for the 28th of this month. I can drop the card off tomorrow if you would like to look at it and see if it can/ needs to be adjusted.  Thank Sherry Chavez

## 2023-09-19 NOTE — TELEPHONE ENCOUNTER
I really don't see much in the way of difference between the days she had a CPAP card in and not. Wonder if she actually just had a cold? Would recommend that we can trial replacing the CPAP card to the machine and see if she has any further symptoms. There is nothing on that card that should make a difference.

## 2023-09-25 ENCOUNTER — OFFICE VISIT (OUTPATIENT)
Dept: FAMILY MEDICINE CLINIC | Facility: CLINIC | Age: 63
End: 2023-09-25
Payer: MEDICARE

## 2023-09-25 VITALS
HEIGHT: 67 IN | HEART RATE: 73 BPM | DIASTOLIC BLOOD PRESSURE: 72 MMHG | OXYGEN SATURATION: 97 % | TEMPERATURE: 97 F | SYSTOLIC BLOOD PRESSURE: 126 MMHG | RESPIRATION RATE: 18 BRPM | BODY MASS INDEX: 41.75 KG/M2 | WEIGHT: 266 LBS

## 2023-09-25 DIAGNOSIS — E11.9 CONTROLLED TYPE 2 DIABETES MELLITUS WITHOUT COMPLICATION, WITHOUT LONG-TERM CURRENT USE OF INSULIN (HCC): ICD-10-CM

## 2023-09-25 DIAGNOSIS — E53.8 B12 DEFICIENCY: ICD-10-CM

## 2023-09-25 DIAGNOSIS — I10 BENIGN ESSENTIAL HYPERTENSION: ICD-10-CM

## 2023-09-25 DIAGNOSIS — G47.33 OSA (OBSTRUCTIVE SLEEP APNEA): ICD-10-CM

## 2023-09-25 DIAGNOSIS — E66.01 CLASS 3 SEVERE OBESITY DUE TO EXCESS CALORIES WITH SERIOUS COMORBIDITY AND BODY MASS INDEX (BMI) OF 40.0 TO 44.9 IN ADULT (HCC): ICD-10-CM

## 2023-09-25 DIAGNOSIS — M19.90 ARTHRITIS: ICD-10-CM

## 2023-09-25 DIAGNOSIS — E20.9 HYPOPARATHYROIDISM, UNSPECIFIED HYPOPARATHYROIDISM TYPE (HCC): ICD-10-CM

## 2023-09-25 DIAGNOSIS — M51.36 DEGENERATIVE DISC DISEASE, LUMBAR: ICD-10-CM

## 2023-09-25 DIAGNOSIS — E89.0 POSTOPERATIVE HYPOTHYROIDISM: ICD-10-CM

## 2023-09-25 DIAGNOSIS — E78.00 PURE HYPERCHOLESTEROLEMIA: ICD-10-CM

## 2023-09-25 DIAGNOSIS — Z01.818 PREOP GENERAL PHYSICAL EXAM: Primary | ICD-10-CM

## 2023-09-25 DIAGNOSIS — E55.9 VITAMIN D DEFICIENCY: ICD-10-CM

## 2023-09-25 PROBLEM — N85.01 SIMPLE ENDOMETRIAL HYPERPLASIA WITHOUT ATYPIA: Status: ACTIVE | Noted: 2023-09-25

## 2023-09-25 PROCEDURE — 99215 OFFICE O/P EST HI 40 MIN: CPT | Performed by: FAMILY MEDICINE

## 2023-09-25 RX ORDER — LEVOCETIRIZINE DIHYDROCHLORIDE 5 MG/1
5 TABLET, FILM COATED ORAL
COMMUNITY

## 2023-09-25 RX ORDER — GLIMEPIRIDE 1 MG/1
1 TABLET ORAL
COMMUNITY
Start: 2023-08-22

## 2023-09-26 NOTE — PATIENT INSTRUCTIONS
General eval for anesthesia and stress of bariatric surgery- patient found to be clinically stable for anesthesia and surgical bariatric procedure    Pt to f.u postoperatively as needed and for routine health care

## 2023-09-28 ENCOUNTER — OFFICE VISIT (OUTPATIENT)
Dept: FAMILY MEDICINE CLINIC | Facility: CLINIC | Age: 63
End: 2023-09-28
Payer: MEDICARE

## 2023-09-28 VITALS
WEIGHT: 270 LBS | TEMPERATURE: 98 F | BODY MASS INDEX: 42.38 KG/M2 | RESPIRATION RATE: 18 BRPM | SYSTOLIC BLOOD PRESSURE: 126 MMHG | OXYGEN SATURATION: 95 % | HEART RATE: 88 BPM | DIASTOLIC BLOOD PRESSURE: 58 MMHG | HEIGHT: 67 IN

## 2023-09-28 DIAGNOSIS — G47.33 OSA (OBSTRUCTIVE SLEEP APNEA): Primary | ICD-10-CM

## 2023-09-28 DIAGNOSIS — E66.01 CLASS 3 SEVERE OBESITY DUE TO EXCESS CALORIES WITH SERIOUS COMORBIDITY AND BODY MASS INDEX (BMI) OF 40.0 TO 44.9 IN ADULT (HCC): ICD-10-CM

## 2023-09-28 PROCEDURE — 99214 OFFICE O/P EST MOD 30 MIN: CPT | Performed by: FAMILY MEDICINE

## 2025-01-02 NOTE — TELEPHONE ENCOUNTER
From: Jareth Whittington  Sent: 8/1/2017 10:10 PM CDT  Subject: Medication Renewal Request    Jareth Whittington would like a refill of the following medications:  Eszopiclone 2 MG Oral Tab DANYA Neal]    Preferred pharmacy: [facility] - Pt enc to call her insurance to see what is covered.  Works at [employer].  R insurance.    Works with people that have the same insurance, however, different coverage.  They may have different levels of insurance coverage.  Has not touched prescription.  Was given to her 1 yr ago.  Never filled it.    Pt will contact pharmacy to see if she can still fill this medication and how much it will cost her.  She will also contact her insurance company to see what other medication if covered and how much that would cost.  She will then get back to us with any Q's or concerns.

## (undated) NOTE — LETTER
02/25/21        [facility] [address]      Dear Pasquale Roche records indicate that you have outstanding lab work and or testing that was ordered for you and has not yet been completed:  Orders Placed This Encounter      C

## (undated) NOTE — MR AVS SNAPSHOT
[address]  [place] [phone]  [phone]               [facility] you for choosing [facility]   We are glad to serve you and happy to provide you with this summary o What changed:  Another medication with the same name was removed. Continue taking this medication, and follow the directions you see here.    Commonly known as:  VALISONE           calciTRIOL 0.25 MCG Caps   Commonly known as:  Faby Hancock If you've recently had a stay at the Hospital you can access your discharge instructions in [facility] by going to Visits < Admission Summaries.  If you've been to the Emergency Department or your doctor's office, you can view your past visit information in My

## (undated) NOTE — MR AVS SNAPSHOT
[address]  [place] [address] [phone]  [phone]               Thank you for choosing us for your health care visit with Bruce Rodríguez MD.  We are glad to serve you and happy to provide you with this summary Desvenlafaxine Succinate ER 50 MG Tb24   Take 1 tablet (50 mg total) by mouth daily.    What changed:    - how much to take  - how to take this  - when to take this   Commonly known as:  PRISTIQ           Metoprolol Succinate ER 25 MG Tb24   Take 1 tablet - Metoprolol Succinate ER 25 MG Tb24  - Potassium Chloride ER 20 MEQ Tbcr  - Triamterene-HCTZ 37.5-25 MG Caps            [facility] [employer] [address]     Sign up for Intio, your secure online medical record.   Intio will allow you to access patient instructions from your re Start activities slowly and build up over time Do what you like   Get your heart pumping – brisk walking, biking, swimming Even 10 minute increments are effective and add up over the week   2 ½ hours per week – spread out over time Use a sesar to keep you